# Patient Record
Sex: FEMALE | Race: WHITE | NOT HISPANIC OR LATINO | Employment: FULL TIME | URBAN - METROPOLITAN AREA
[De-identification: names, ages, dates, MRNs, and addresses within clinical notes are randomized per-mention and may not be internally consistent; named-entity substitution may affect disease eponyms.]

---

## 2017-03-01 RX ORDER — HYDROCHLOROTHIAZIDE 25 MG/1
TABLET ORAL
Qty: 90 TAB | Refills: 0 | Status: SHIPPED | OUTPATIENT
Start: 2017-03-01 | End: 2017-04-11 | Stop reason: SDUPTHER

## 2017-03-01 NOTE — Clinical Note
March 1, 2017        Mary Young  [address]  [facility] [address]        Dear Mary:    .This letter is to inform you the you are due for an annual appointment. Please contact our scheduling department at [phone] To schedule       If you have any questions or concerns, please don't hesitate to call.        Sincerely,        Juan A Tsang M.D.    Electronically Signed

## 2017-03-01 NOTE — TELEPHONE ENCOUNTER
Refill done. Patient is due for annual appointment. Please have patient schedule.  Juan A Tsang M.D.

## 2017-03-06 RX ORDER — LOSARTAN POTASSIUM 100 MG/1
TABLET ORAL
Refills: 0 | OUTPATIENT
Start: 2017-03-06

## 2017-03-06 NOTE — TELEPHONE ENCOUNTER
Refill declined.  Patient is due for annual appointment. We have asked patient to schedule appointment for the last 6 months and she has not done so.  Unfortunately cannot continue prescribing one pressure medication without checking blood pressure and lab work to monitoring for potential side effects.  Juan A Tsang M.D.

## 2017-04-11 ENCOUNTER — OFFICE VISIT (OUTPATIENT)
Dept: MEDICAL GROUP | Facility: MEDICAL CENTER | Age: 57
End: 2017-04-11
Payer: COMMERCIAL

## 2017-04-11 VITALS
TEMPERATURE: 99.1 F | HEART RATE: 74 BPM | BODY MASS INDEX: 39.9 KG/M2 | SYSTOLIC BLOOD PRESSURE: 132 MMHG | DIASTOLIC BLOOD PRESSURE: 84 MMHG | WEIGHT: 216.8 LBS | OXYGEN SATURATION: 97 % | RESPIRATION RATE: 16 BRPM | HEIGHT: 62 IN

## 2017-04-11 DIAGNOSIS — Z00.00 PREVENTATIVE HEALTH CARE: ICD-10-CM

## 2017-04-11 DIAGNOSIS — Z76.89 ENCOUNTER TO ESTABLISH CARE: ICD-10-CM

## 2017-04-11 DIAGNOSIS — E78.00 PURE HYPERCHOLESTEROLEMIA: ICD-10-CM

## 2017-04-11 DIAGNOSIS — E03.8 SUBCLINICAL HYPOTHYROIDISM: ICD-10-CM

## 2017-04-11 DIAGNOSIS — I10 ESSENTIAL HYPERTENSION: ICD-10-CM

## 2017-04-11 PROCEDURE — 99214 OFFICE O/P EST MOD 30 MIN: CPT | Performed by: PHYSICIAN ASSISTANT

## 2017-04-11 RX ORDER — LOSARTAN POTASSIUM 100 MG/1
100 TABLET ORAL
Qty: 90 TAB | Refills: 1 | Status: SHIPPED | OUTPATIENT
Start: 2017-04-11 | End: 2017-10-05 | Stop reason: SDUPTHER

## 2017-04-11 RX ORDER — HYDROCHLOROTHIAZIDE 25 MG/1
25 TABLET ORAL
Qty: 90 TAB | Refills: 1 | Status: SHIPPED | OUTPATIENT
Start: 2017-04-11 | End: 2017-10-10 | Stop reason: SDUPTHER

## 2017-04-11 ASSESSMENT — PATIENT HEALTH QUESTIONNAIRE - PHQ9: CLINICAL INTERPRETATION OF PHQ2 SCORE: 0

## 2017-04-11 NOTE — ASSESSMENT & PLAN NOTE
Previous labs were done in the end of 2015. LDL was 222. HDL was greater than in the upper 80s. Total cholesterol was greater than 300. Both her parents are on lipid lowering medications.

## 2017-04-11 NOTE — ASSESSMENT & PLAN NOTE
This is a 56-year-old female who is here today to get refills of her blood pressure medication. She is not out of medication yet. She takes 2 medications. Looks like her blood pressure is still out of control even though she is on medication. She does take her blood pressure medication on a regular basis. She has a wrist cuff.

## 2017-04-11 NOTE — ASSESSMENT & PLAN NOTE
Last labs showed that her TSH was greater than 5. This was not ever discussed with her. She denies any significant hypothyroid symptoms such as fatigue or weight gain or hair loss.

## 2017-04-11 NOTE — PROGRESS NOTES
"Subjective:   Mary Young is a 56 y.o. female here today to establish care and discuss her hypertension.    Essential hypertension  This is a 56-year-old female who is here today to get refills of her blood pressure medication. She is not out of medication yet. She takes 2 medications. Looks like her blood pressure is still out of control even though she is on medication. She does take her blood pressure medication on a regular basis. She has a wrist cuff.    Hyperlipidemia  Previous labs were done in the end of 2015. LDL was 222. HDL was greater than in the upper 80s. Total cholesterol was greater than 300. Both her parents are on lipid lowering medications.    Subclinical hypothyroidism  Last labs showed that her TSH was greater than 5. This was not ever discussed with her. She denies any significant hypothyroid symptoms such as fatigue or weight gain or hair loss.    Preventative health care  Requesting a referral to see gynecology for a Pap smear. Also requesting mammogram ordered.       Current medicines (including changes today)  Current Outpatient Prescriptions   Medication Sig Dispense Refill   • hydrochlorothiazide (HYDRODIURIL) 25 MG Tab Take 1 Tab by mouth every day. 90 Tab 1   • losartan (COZAAR) 100 MG Tab Take 1 Tab by mouth every day. 90 Tab 1     No current facility-administered medications for this visit.     She  has no past medical history on file.    ROS   No chest pain, no shortness of breath, no abdominal pain and all other systems were reviewed and are negative.       Objective:     Blood pressure 132/84, pulse 74, temperature 37.3 °C (99.1 °F), resp. rate 16, height 1.575 m (5' 2\"), weight 98.34 kg (216 lb 12.8 oz), SpO2 97 %. Body mass index is 39.64 kg/(m^2).   Physical Exam:  Constitutional: Alert, no distress.  Skin: Warm, dry, good turgor, no rashes in visible areas.  Eye: Equal, round and reactive, conjunctiva clear, lids normal.  ENMT: Lips without lesions, good dentition, " oropharynx clear.  Neck: Trachea midline, no masses.   Lymph: No cervical or supraclavicular lymphadenopathy  Respiratory: Unlabored respiratory effort, lungs clear to auscultation, no wheezes, no ronchi.  Cardiovascular: Normal S1, S2, no murmur, no edema.  Abdomen: Soft, non-tender, no masses.  Psych: Alert and oriented x3, normal affect and mood.        Assessment and Plan:   The following treatment plan was discussed    1. Essential hypertension  Chronic condition. Levels slightly elevated today. Provided prescription for hydrochlorothiazide and losartan as directed. Advised her to check her blood pressure at home. Contact me if her blood pressure is not controlled at the range of 120/70.  - hydrochlorothiazide (HYDRODIURIL) 25 MG Tab; Take 1 Tab by mouth every day.  Dispense: 90 Tab; Refill: 1  - losartan (COZAAR) 100 MG Tab; Take 1 Tab by mouth every day.  Dispense: 90 Tab; Refill: 1    2. Subclinical hypothyroidism  Discussed with subclinical presentation of her thyroid. Repeat TSH with free T4.  - TSH+FREE T4    3. Pure hypercholesterolemia  Chronic condition. Obviously her HDL is elevated but so is her LDL. We'll repeat lipid profile. I did urge her that we will likely have to place her on lipid-lowering medication.  - LIPID PROFILE    4. Preventative health care  Ordered fasting labs. Referred to gynecology for Pap smear. Referred for a mammogram. She may contact them for an appointment.  - COMP METABOLIC PANEL; Future  - HEMOGLOBIN A1C; Future  - MA-SCREEN MAMMO W/CAD-BILAT  - REFERRAL TO GYNECOLOGY    5. Encounter to establish care      Followup: Return if symptoms worsen or fail to improve.    Please note that this dictation was created using voice recognition software. I have made every reasonable attempt to correct obvious errors, but I expect that there are errors of grammar and possibly content that I did not discover before finalizing the note.

## 2017-10-05 DIAGNOSIS — I10 ESSENTIAL HYPERTENSION: ICD-10-CM

## 2017-10-05 RX ORDER — LOSARTAN POTASSIUM 100 MG/1
TABLET ORAL
Qty: 90 TAB | Refills: 0 | Status: SHIPPED | OUTPATIENT
Start: 2017-10-05 | End: 2018-04-03 | Stop reason: SDUPTHER

## 2017-10-05 NOTE — TELEPHONE ENCOUNTER
Was the patient seen in the last year in this department? Yes     Does patient have an active prescription for medications requested? No     Received Request Via: Pharmacy     Last Visit: 4/11/17    Last Labs: 11/19/15

## 2017-10-10 DIAGNOSIS — I10 ESSENTIAL HYPERTENSION: ICD-10-CM

## 2017-10-10 RX ORDER — HYDROCHLOROTHIAZIDE 25 MG/1
25 TABLET ORAL
Qty: 90 TAB | Refills: 1 | Status: SHIPPED | OUTPATIENT
Start: 2017-10-10 | End: 2018-04-01 | Stop reason: SDUPTHER

## 2017-10-10 NOTE — TELEPHONE ENCOUNTER
Was the patient seen in the last year in this department? Yes     Does patient have an active prescription for medications requested? No     Received Request Via: Patient     Last Visit: 4/11/17    Last Labs: 11/19/15

## 2018-01-23 ENCOUNTER — OFFICE VISIT (OUTPATIENT)
Dept: MEDICAL GROUP | Facility: MEDICAL CENTER | Age: 58
End: 2018-01-23
Payer: COMMERCIAL

## 2018-01-23 VITALS
DIASTOLIC BLOOD PRESSURE: 74 MMHG | HEART RATE: 100 BPM | RESPIRATION RATE: 16 BRPM | TEMPERATURE: 98.2 F | OXYGEN SATURATION: 96 % | BODY MASS INDEX: 41.41 KG/M2 | HEIGHT: 62 IN | SYSTOLIC BLOOD PRESSURE: 130 MMHG | WEIGHT: 225 LBS

## 2018-01-23 DIAGNOSIS — E66.01 MORBID OBESITY WITH BMI OF 40.0-44.9, ADULT (HCC): ICD-10-CM

## 2018-01-23 DIAGNOSIS — Z12.31 SCREENING MAMMOGRAM, ENCOUNTER FOR: ICD-10-CM

## 2018-01-23 DIAGNOSIS — N76.0 ACUTE VAGINITIS: ICD-10-CM

## 2018-01-23 PROCEDURE — 99214 OFFICE O/P EST MOD 30 MIN: CPT | Performed by: FAMILY MEDICINE

## 2018-01-23 RX ORDER — CLOTRIMAZOLE AND BETAMETHASONE DIPROPIONATE 10; .64 MG/G; MG/G
CREAM TOPICAL
Qty: 30 G | Refills: 1 | Status: SHIPPED | OUTPATIENT
Start: 2018-01-23 | End: 2019-04-09

## 2018-01-23 RX ORDER — FLUCONAZOLE 150 MG/1
150 TABLET ORAL
Qty: 2 TAB | Refills: 0 | Status: SHIPPED | OUTPATIENT
Start: 2018-01-23 | End: 2019-04-09

## 2018-01-23 NOTE — ASSESSMENT & PLAN NOTE
This patient just completed a round of Augmentin that she had to take because of an eye infection. Her last dose was 48 hours ago. She believes on that day or possibly the day before she tried to have intercourse with her  and it was so painful she could not complete the act. She's never had that problem before. She went online and did some research and it stated that one of the side effects of Augmentin often his yeast infection. She went to the store bought something for yeast then became uncomfortable with treating herself and is here today for treatment.

## 2018-01-23 NOTE — PROGRESS NOTES
CC: Possible vaginitis    HISTORY OF PRESENT ILLNESS: Patient is a 57 y.o. female established patient who presents today to complain that after completing a round of Augmentin for an eye infection she's now having discomfort in her genital area with associated erythema and minimal discharge. She decided that this was a yeast infection and went to get this over-the-counter medication to treat it but then decided that she needed to be seen.    Health Maintenance: Patient is overdue for a Pap but because she has a yeast infection today I cannot do her Pap smear. She will go for mammogram and that was ordered today.    Acute vaginitis  This patient just completed a round of Augmentin that she had to take because of an eye infection. Her last dose was 48 hours ago. She believes on that day or possibly the day before she tried to have intercourse with her  and it was so painful she could not complete the act. She's never had that problem before. She went online and did some research and it stated that one of the side effects of Augmentin often his yeast infection. She went to the store bought something for yeast then became uncomfortable with treating herself and is here today for treatment.      Patient Active Problem List    Diagnosis Date Noted   • Morbid obesity with BMI of 40.0-44.9, adult ([facility]) 01/23/2018   • Acute vaginitis 01/23/2018   • Subclinical hypothyroidism 04/11/2017   • Preventative health care 04/11/2017   • Hyperlipidemia 12/13/2015   • Essential hypertension 05/28/2015   • Insomnia 05/28/2015   • Obesity (BMI 30-39.9) 05/28/2015      Allergies:Patient has no known allergies.    Current Outpatient Prescriptions   Medication Sig Dispense Refill   • clotrimazole-betamethasone (LOTRISONE) 1-0.05 % Cream Apply to affected area 2 times/day til clear 30 g 1   • fluconazole (DIFLUCAN) 150 MG tablet Take 1 Tab by mouth every 7 days. 2 Tab 0   • hydrochlorothiazide (HYDRODIURIL) 25 MG Tab Take 1 Tab by  "mouth every day. 90 Tab 1   • losartan (COZAAR) 100 MG Tab TAKE 1 TABLET BY MOUTH EVERY DAY. 90 Tab 0     No current facility-administered medications for this visit.        Social History   Substance Use Topics   • Smoking status: Never Smoker   • Smokeless tobacco: Never Used   • Alcohol use 0.0 oz/week      Comment: 3 drinks daily     Social History     Social History Narrative   • No narrative on file       Family History   Problem Relation Age of Onset   • Hypertension Mother    • Hypertension Father         ROS:     - Constitutional: Negative for fever, chills, unexpected weight change, and fatigue/generalized weakness.     - HEENT: Negative for headaches, vision changes, hearing changes, ear pain, ear discharge, rhinorrhea, sinus congestion, sore throat, and neck pain.      - Respiratory: Negative for cough, sputum production, chest congestion, dyspnea, wheezing, and crackles.      - Cardiovascular: Negative for chest pain, palpitations, orthopnea, and bilateral lower extremity edema.     - Gastrointestinal: Negative for heartburn, nausea, vomiting, abdominal pain, hematochezia, melena, diarrhea, constipation, and greasy/foul-smelling stools.     - Genitourinary: . Positive for cheesy white discharge and erythema and itching in the genital area    - Musculoskeletal: Negative for myalgias, back pain, and joint pain.     - Skin: Negative for rash, itching, cyanotic skin color change.     - Neurological: Negative for dizziness, tingling, tremors, focal sensory deficit, focal weakness and headaches.     - Endo/Heme/Allergies: Does not bruise/bleed easily.     - Psychiatric/Behavioral: Negative for depression, suicidal/homicidal ideation and memory loss.        - NOTE: All other systems reviewed and are negative, except as in HPI.      Exam:    Blood pressure 130/74, pulse 100, temperature 36.8 °C (98.2 °F), resp. rate 16, height 1.575 m (5' 2\"), weight 102.1 kg (225 lb), SpO2 96 %, not currently breastfeeding. " Body mass index is 41.15 kg/m².    General:  Well nourished, well developed female in NAD  Head is grossly normal.  Pelvic exam:  Perineum: No external lesions are noted, color is erythematous throughout and extending down onto the perineum and circling the anus.  Vagina: Vaginal vault is well rugated. Has a thick white cheesy discharge Cervix: Parous, nonfriable        Please note that this dictation was created using voice recognition software. I have made every reasonable attempt to correct obvious errors, but I expect that there are errors of grammar and possibly content that I did not discover before finalizing the note.    Assessment/Plan:  1. Morbid obesity with BMI of 40.0-44.9, adult ([facility])  Patient working on weight loss  - Patient identified as having weight management issue.  Appropriate orders and counseling given.    2. Screening mammogram, encounter for  Patient will get her mammogram done  - MA-MAMMO SCREENING BILAT W/JESSE W/CAD; Future    3. Acute vaginitis  It appears this patient has an acute yeast vulvovaginitis. We will treat with Diflucan 150 mg now and again in 7 days. She'll use Lotrimin cream to the external tissue to help with itching twice a day until the erythema is gone. I have asked that the patient wait at least 3 days have intercourse with her .

## 2018-02-05 ENCOUNTER — HOSPITAL ENCOUNTER (OUTPATIENT)
Dept: RADIOLOGY | Facility: MEDICAL CENTER | Age: 58
End: 2018-02-05
Attending: FAMILY MEDICINE
Payer: COMMERCIAL

## 2018-02-05 DIAGNOSIS — Z12.31 SCREENING MAMMOGRAM, ENCOUNTER FOR: ICD-10-CM

## 2018-02-05 PROCEDURE — 77067 SCR MAMMO BI INCL CAD: CPT

## 2018-03-29 ENCOUNTER — HOSPITAL ENCOUNTER (OUTPATIENT)
Facility: MEDICAL CENTER | Age: 58
End: 2018-03-29
Attending: OBSTETRICS & GYNECOLOGY
Payer: COMMERCIAL

## 2018-03-29 ENCOUNTER — GYNECOLOGY VISIT (OUTPATIENT)
Dept: OBGYN | Facility: MEDICAL CENTER | Age: 58
End: 2018-03-29
Payer: COMMERCIAL

## 2018-03-29 VITALS
HEIGHT: 62 IN | DIASTOLIC BLOOD PRESSURE: 82 MMHG | SYSTOLIC BLOOD PRESSURE: 125 MMHG | WEIGHT: 207 LBS | BODY MASS INDEX: 38.09 KG/M2

## 2018-03-29 DIAGNOSIS — Z11.51 SCREENING FOR HUMAN PAPILLOMAVIRUS (HPV): ICD-10-CM

## 2018-03-29 DIAGNOSIS — Z01.419 WELL WOMAN EXAM WITH ROUTINE GYNECOLOGICAL EXAM: ICD-10-CM

## 2018-03-29 DIAGNOSIS — N95.2 VAGINAL ATROPHY: ICD-10-CM

## 2018-03-29 DIAGNOSIS — Z12.4 PAP SMEAR FOR CERVICAL CANCER SCREENING: ICD-10-CM

## 2018-03-29 PROCEDURE — 88175 CYTOPATH C/V AUTO FLUID REDO: CPT

## 2018-03-29 PROCEDURE — 99386 PREV VISIT NEW AGE 40-64: CPT | Performed by: OBSTETRICS & GYNECOLOGY

## 2018-03-29 PROCEDURE — 87624 HPV HI-RISK TYP POOLED RSLT: CPT

## 2018-03-29 NOTE — PROGRESS NOTES
"Subjective:      Mary Young is a 57 y.o. female who presents with New Patient (Annual exam)        CC: annual exam    HPI: 56 yo  menopausal female presents for annual exam.  She has had not bleeding for 5 years.  She recently had a vaginal yeast infection after taking Augmentin in January.  She was treated with Diflucan, but feels like her vagina has been irritated ever since.  She now has pain with intercourse, especially on the right side of her vagina.    ROS REVIEW OF SYSTEMS:    Pertinent positives and negatives mentioned in HPI.    All other systems reviewed and are negative or noncontributory.       Objective:     /82   Ht 1.575 m (5' 2\")   Wt 93.9 kg (207 lb)   Breastfeeding? No   BMI 37.86 kg/m²      Physical Exam      GENERAL: Alert, in no apparent distress  PSYCHIATRIC: Appropriate affect, intact insight and judgement.  NECK:  Nontender, no masses.  No Thyromegaly or nodules. No lymphadenopathy.  RESPIRATORY: Normal respiratory effort.  Lungs clear to auscultation.   CARDIOVASCULAR: RRR, no murmur, gallop, or rub.  ABDOMEN: Soft, obese, nontender, nondistended.  No palpable masses.  No rebound or guarding.  No inguinal lymphadenopathy.  No hepatosplenomegaly.  No hernias.  BACK: No CVA tenderness  EXTREMITIES: No edema  SKIN: No rash    BREAST: No masses or tenderness.  No skin changes.  No nipple inversion or discharge. No axillary lymphadenopathy.      GENITOURINARY:  Normal external genitalia, no lesions.  Normal urethral meatus, no masses or tenderness.  Normal bladder without fullness or masses.  Vagina atrophic, minimal white vaginal discharge. No lesions.   Cervix without lesions or discharge, nontender.  Uterus normal size, shape, and contour, nontender.  Adnexa nontender, no masses.  Exam limited due to habitus.   Normal anus and perineum.    Rectal Exam - not indicated.       Assessment/Plan:     1. Well woman exam with routine gynecological exam  Reviewed monthly self " breast exams and need for yearly mammograms starting at age 40.  Discussed calcium intake, Vitamin D,  and weight bearing exercise for bone health.  Mammogram current.   - ThinPrep Pap, w/HPV rflx to genotype; Future    2. Pap smear for cervical cancer screening  - ThinPrep Pap, w/HPV rflx to genotype; Future    3. Screening for human papillomavirus (HPV)  - ThinPrep Pap, w/HPV rflx to genotype; Future    4. Vaginal atrophy  Premarin vaginal cream 0.5 mg per vagina nightly for two weeks, then every other night. Reviewed minimal risks, as limited systemic absorption.      F/U 1 month.

## 2018-03-31 LAB
CYTOLOGY REG CYTOL: NORMAL
HPV HR 12 DNA CVX QL NAA+PROBE: NEGATIVE
HPV16 DNA SPEC QL NAA+PROBE: NEGATIVE
HPV18 DNA SPEC QL NAA+PROBE: NEGATIVE
SPECIMEN SOURCE: NORMAL

## 2018-04-01 DIAGNOSIS — I10 ESSENTIAL HYPERTENSION: ICD-10-CM

## 2018-04-02 RX ORDER — HYDROCHLOROTHIAZIDE 25 MG/1
TABLET ORAL
Qty: 90 TAB | Refills: 0 | Status: SHIPPED | OUTPATIENT
Start: 2018-04-02 | End: 2018-06-30 | Stop reason: SDUPTHER

## 2018-04-03 DIAGNOSIS — I10 ESSENTIAL HYPERTENSION: ICD-10-CM

## 2018-04-03 RX ORDER — LOSARTAN POTASSIUM 100 MG/1
TABLET ORAL
Qty: 90 TAB | Refills: 0 | Status: SHIPPED | OUTPATIENT
Start: 2018-04-03 | End: 2018-07-06 | Stop reason: SDUPTHER

## 2018-05-03 ENCOUNTER — GYNECOLOGY VISIT (OUTPATIENT)
Dept: OBGYN | Facility: MEDICAL CENTER | Age: 58
End: 2018-05-03
Payer: COMMERCIAL

## 2018-05-03 VITALS
WEIGHT: 209 LBS | BODY MASS INDEX: 38.46 KG/M2 | HEIGHT: 62 IN | DIASTOLIC BLOOD PRESSURE: 80 MMHG | SYSTOLIC BLOOD PRESSURE: 120 MMHG

## 2018-05-03 DIAGNOSIS — N95.2 VAGINAL ATROPHY: ICD-10-CM

## 2018-05-03 DIAGNOSIS — N94.10 DYSPAREUNIA IN FEMALE: ICD-10-CM

## 2018-05-03 PROCEDURE — 99213 OFFICE O/P EST LOW 20 MIN: CPT | Performed by: OBSTETRICS & GYNECOLOGY

## 2018-05-03 NOTE — PROGRESS NOTES
"S: Mary presents for f/u of vaginal dryness/pain, and pain with intercourse.  She has been using the premarin cream as prescribed, and states her symptoms are much improved.  She still has some pain on the right, deep in the vagina.  No vaginal bleeding.  No vaginal discharge.  No pelvic pain.     O:Blood pressure 120/80, height 1.575 m (5' 2\"), weight 94.8 kg (209 lb), not currently breastfeeding.    GENERAL: Alert, in no apparent distress  PSYCHIATRIC: Appropriate affect, intact insight and judgement.    GENITOURINARY:  Normal external genitalia, no lesions.  Labia with mild erythema.  Normal urethral meatus, no masses or tenderness.  Normal bladder without fullness or masses.  Vagina well estrogenized, no vaginal discharge or lesions.  Cervix without lesions or discharge, nontender.  Uterus normal size, shape, and contour, nontender.  Adnexa nontender, no masses.  Normal anus and perineum.    Rectal Exam - not indicated.    A/P:  1. Vaginal atrophy - improved. Continue premarin 0.5 mg per vagina twice weekly.   Explained risks are minimal due to minimal systemic absorption. Call if evidence of vaginal bleeding.   2. Dyspareunia - improved.     F/U prn.  "

## 2018-06-13 ENCOUNTER — TELEPHONE (OUTPATIENT)
Dept: OBGYN | Facility: CLINIC | Age: 58
End: 2018-06-13

## 2018-06-30 DIAGNOSIS — I10 ESSENTIAL HYPERTENSION: ICD-10-CM

## 2018-07-02 RX ORDER — HYDROCHLOROTHIAZIDE 25 MG/1
TABLET ORAL
Qty: 90 TAB | Refills: 0 | Status: SHIPPED | OUTPATIENT
Start: 2018-07-02 | End: 2018-10-03 | Stop reason: SDUPTHER

## 2018-07-06 DIAGNOSIS — I10 ESSENTIAL HYPERTENSION: ICD-10-CM

## 2018-07-06 RX ORDER — LOSARTAN POTASSIUM 100 MG/1
TABLET ORAL
Qty: 90 TAB | Refills: 0 | Status: SHIPPED | OUTPATIENT
Start: 2018-07-06 | End: 2018-10-01 | Stop reason: SDUPTHER

## 2019-03-29 DIAGNOSIS — I10 ESSENTIAL HYPERTENSION: ICD-10-CM

## 2019-03-29 RX ORDER — HYDROCHLOROTHIAZIDE 25 MG/1
25 TABLET ORAL
Qty: 30 TAB | Refills: 0 | Status: SHIPPED | OUTPATIENT
Start: 2019-03-29 | End: 2019-04-09 | Stop reason: SDUPTHER

## 2019-04-08 ENCOUNTER — TELEPHONE (OUTPATIENT)
Dept: MEDICAL GROUP | Facility: MEDICAL CENTER | Age: 59
End: 2019-04-08

## 2019-04-08 NOTE — TELEPHONE ENCOUNTER
1. Caller Name: Mary Young                                           Call Back Number: [phone] (home) [phone] (work)        Patient approves a detailed voicemail message: yes    Pt is requesting alternative to losartan as it is currently on recall for causing cancer.

## 2019-04-09 ENCOUNTER — OFFICE VISIT (OUTPATIENT)
Dept: MEDICAL GROUP | Facility: MEDICAL CENTER | Age: 59
End: 2019-04-09
Payer: COMMERCIAL

## 2019-04-09 VITALS
RESPIRATION RATE: 12 BRPM | DIASTOLIC BLOOD PRESSURE: 90 MMHG | WEIGHT: 207.45 LBS | BODY MASS INDEX: 38.18 KG/M2 | TEMPERATURE: 99.3 F | HEART RATE: 101 BPM | HEIGHT: 62 IN | SYSTOLIC BLOOD PRESSURE: 130 MMHG | OXYGEN SATURATION: 97 %

## 2019-04-09 DIAGNOSIS — E03.8 SUBCLINICAL HYPOTHYROIDISM: ICD-10-CM

## 2019-04-09 DIAGNOSIS — Z00.00 PREVENTATIVE HEALTH CARE: ICD-10-CM

## 2019-04-09 DIAGNOSIS — I10 ESSENTIAL HYPERTENSION: ICD-10-CM

## 2019-04-09 DIAGNOSIS — E66.9 OBESITY (BMI 30-39.9): ICD-10-CM

## 2019-04-09 DIAGNOSIS — E78.00 PURE HYPERCHOLESTEROLEMIA: ICD-10-CM

## 2019-04-09 DIAGNOSIS — Z12.31 VISIT FOR SCREENING MAMMOGRAM: ICD-10-CM

## 2019-04-09 PROBLEM — E66.01 MORBID OBESITY WITH BMI OF 40.0-44.9, ADULT (HCC): Status: RESOLVED | Noted: 2018-01-23 | Resolved: 2019-04-09

## 2019-04-09 PROBLEM — N76.0 ACUTE VAGINITIS: Status: RESOLVED | Noted: 2018-01-23 | Resolved: 2019-04-09

## 2019-04-09 PROCEDURE — 99214 OFFICE O/P EST MOD 30 MIN: CPT | Performed by: PHYSICIAN ASSISTANT

## 2019-04-09 RX ORDER — HYDROCHLOROTHIAZIDE 25 MG/1
25 TABLET ORAL
Qty: 90 TAB | Refills: 3 | Status: SHIPPED | OUTPATIENT
Start: 2019-04-09 | End: 2020-04-19

## 2019-04-09 RX ORDER — LOSARTAN POTASSIUM 100 MG/1
100 TABLET ORAL
Qty: 90 TAB | Refills: 3 | Status: SHIPPED | OUTPATIENT
Start: 2019-04-09 | End: 2020-04-19

## 2019-04-09 NOTE — ASSESSMENT & PLAN NOTE
This is a 58-year-old female who is here today to discuss her history of hypertension.  She is concerned over the recall of her losartan.  Unable to get a direct response from the  company regarding the current dose is considered contaminated or not.  She is concerned about developing cancer.  She was given a new prescription of losartan.  The batch number as well as expiration date are not on the list of recall medications.  She is also taking hydrochlorothiazide 25 mg daily.  Takes both medications in combination.  She is going to be more persistent with weight loss.  Start an exercise routine.  Is eating healthier.  Like not to be on medication if at all possible.

## 2019-04-09 NOTE — ASSESSMENT & PLAN NOTE
Past her TSH value was slightly elevated in the 5.7 range.  She did not follow-up and repeat the labs.  Complains of hair loss.

## 2019-04-09 NOTE — ASSESSMENT & PLAN NOTE
Chronic condition.  Has lost weight since the last time I saw her.  She plans to continue to lose weight by changing lifestyle.

## 2019-04-09 NOTE — ASSESSMENT & PLAN NOTE
Chronic condition.  Patient not interested in starting medication.  No recent cholesterol profile.

## 2019-04-09 NOTE — PROGRESS NOTES
Subjective:   Mary Young is a 58 y.o. female here today for hypertension, hyperlipidemia, obesity and subclinical hypothyroidism.    Essential hypertension  This is a 58-year-old female who is here today to discuss her history of hypertension.  She is concerned over the recall of her losartan.  Unable to get a direct response from the  company regarding the current dose is considered contaminated or not.  She is concerned about developing cancer.  She was given a new prescription of losartan.  The batch number as well as expiration date are not on the list of recall medications.  She is also taking hydrochlorothiazide 25 mg daily.  Takes both medications in combination.  She is going to be more persistent with weight loss.  Start an exercise routine.  Is eating healthier.  Like not to be on medication if at all possible.    Hyperlipidemia  Chronic condition.  Patient not interested in starting medication.  No recent cholesterol profile.    Obesity (BMI 30-39.9)  Chronic condition.  Has lost weight since the last time I saw her.  She plans to continue to lose weight by changing lifestyle.    Subclinical hypothyroidism  Past her TSH value was slightly elevated in the 5.7 range.  She did not follow-up and repeat the labs.  Complains of hair loss.       Current medicines (including changes today)  Current Outpatient Prescriptions   Medication Sig Dispense Refill   • hydroCHLOROthiazide (HYDRODIURIL) 25 MG Tab Take 1 Tab by mouth every day. 90 Tab 3   • losartan (COZAAR) 100 MG Tab Take 1 Tab by mouth every day. 90 Tab 3     No current facility-administered medications for this visit.      She  has a past medical history of Acute vaginitis (1/23/2018).    Social History and Family History were reviewed and updated.    ROS   No chest pain, no shortness of breath, no abdominal pain and all other systems were reviewed and are negative.       Objective:     /90 (BP Location: Right arm, Patient  "Position: Sitting, BP Cuff Size: Adult)   Pulse (!) 101   Temp 37.4 °C (99.3 °F) (Temporal)   Resp 12   Ht 1.575 m (5' 2\")   Wt 94.1 kg (207 lb 7.3 oz)   SpO2 97%  Body mass index is 37.94 kg/m².   Physical Exam:  Constitutional: Alert, no distress.  Skin: Warm, dry, good turgor, no rashes in visible areas.  Eye: Equal, round and reactive, conjunctiva clear, lids normal.  ENMT: Lips without lesions, good dentition, oropharynx clear.  Neck: Trachea midline, no masses.   Lymph: No cervical or supraclavicular lymphadenopathy  Respiratory: Unlabored respiratory effort, lungs clear to auscultation, no wheezes, no ronchi.  Cardiovascular: Normal S1, S2, no murmur, no edema.  Abdomen: Soft, non-tender, no masses.  Psych: Alert and oriented x3, normal affect and mood.        Assessment and Plan:   The following treatment plan was discussed    1. Essential hypertension  Chronic condition.  Stable.  Renewed hydrochlorothiazide and Cozaar.  Discussed in length that her current batch of medication is not contaminated.  Minimal risk with the contamination with [employer].  Continue to watch for possible notifications from her pharmacy.  Advised to eat healthier and exercise routinely.  Lose weight.  - hydroCHLOROthiazide (HYDRODIURIL) 25 MG Tab; Take 1 Tab by mouth every day.  Dispense: 90 Tab; Refill: 3  - Comp Metabolic Panel; Future  - losartan (COZAAR) 100 MG Tab; Take 1 Tab by mouth every day.  Dispense: 90 Tab; Refill: 3    2. Subclinical hypothyroidism  Previously noted on a lab draw with a slightly elevated TSH.  Repeat testing.  - TSH WITH REFLEX TO FT4; Future    3. Obesity (BMI 30-39.9)  Chronic condition.  Slight improvement.  Continue lifestyle changes.  Will monitor.  - Patient identified as having weight management issue.  Appropriate orders and counseling given.    4. Visit for screening mammogram  Ordered mammogram.  Provided information.  - MA-SCREENING MAMMO BILAT W/TOMOSYNTHESIS W/CAD; Future    5. " Preventative health care  Ordered labs.  Provided information and asked to fast.  - Comp Metabolic Panel; Future  - Lipid Profile; Future  - HEMOGLOBIN A1C; Future    6. Pure hypercholesterolemia  Chronic condition.  Unknown status.  Ordered lipid profile.      Followup: Return in about 1 year (around 4/9/2020).    Please note that this dictation was created using voice recognition software. I have made every reasonable attempt to correct obvious errors, but I expect that there are errors of grammar and possibly content that I did not discover before finalizing the note.

## 2020-05-19 DIAGNOSIS — I10 ESSENTIAL HYPERTENSION: ICD-10-CM

## 2020-05-19 RX ORDER — LOSARTAN POTASSIUM 100 MG/1
100 TABLET ORAL
Qty: 30 TAB | Refills: 0 | Status: SHIPPED | OUTPATIENT
Start: 2020-05-19 | End: 2021-09-21

## 2020-05-19 RX ORDER — HYDROCHLOROTHIAZIDE 25 MG/1
25 TABLET ORAL
Qty: 30 TAB | Refills: 0 | Status: SHIPPED | OUTPATIENT
Start: 2020-05-19 | End: 2021-09-21

## 2020-05-19 NOTE — TELEPHONE ENCOUNTER
Received request via: Patient    Was the patient seen in the last year in this department? Yes    Does the patient have an active prescription (recently filled or refills available) for medication(s) requested? No     Requested Prescriptions     Pending Prescriptions Disp Refills   • hydroCHLOROthiazide (HYDRODIURIL) 25 MG Tab 30 Tab 1     Sig: Take 1 Tab by mouth every day.   • losartan (COZAAR) 100 MG Tab 30 Tab 1     Sig: Take 1 Tab by mouth every day.       PT REQUEST 90 DAY SUPPLY

## 2020-06-25 ENCOUNTER — HOSPITAL ENCOUNTER (OUTPATIENT)
Dept: LAB | Facility: MEDICAL CENTER | Age: 60
End: 2020-06-25
Attending: FAMILY MEDICINE
Payer: COMMERCIAL

## 2020-06-25 LAB
ALBUMIN SERPL BCP-MCNC: 4.4 G/DL (ref 3.2–4.9)
ALBUMIN/GLOB SERPL: 1.4 G/DL
ALP SERPL-CCNC: 53 U/L (ref 30–99)
ALT SERPL-CCNC: 41 U/L (ref 2–50)
ANION GAP SERPL CALC-SCNC: 11 MMOL/L (ref 7–16)
AST SERPL-CCNC: 34 U/L (ref 12–45)
BASOPHILS # BLD AUTO: 1.2 % (ref 0–1.8)
BASOPHILS # BLD: 0.07 K/UL (ref 0–0.12)
BILIRUB SERPL-MCNC: 0.7 MG/DL (ref 0.1–1.5)
BUN SERPL-MCNC: 13 MG/DL (ref 8–22)
CALCIUM SERPL-MCNC: 9.7 MG/DL (ref 8.4–10.2)
CHLORIDE SERPL-SCNC: 102 MMOL/L (ref 96–112)
CHOLEST SERPL-MCNC: 252 MG/DL (ref 100–199)
CO2 SERPL-SCNC: 24 MMOL/L (ref 20–33)
CREAT SERPL-MCNC: 0.73 MG/DL (ref 0.5–1.4)
EOSINOPHIL # BLD AUTO: 0.14 K/UL (ref 0–0.51)
EOSINOPHIL NFR BLD: 2.3 % (ref 0–6.9)
ERYTHROCYTE [DISTWIDTH] IN BLOOD BY AUTOMATED COUNT: 41.8 FL (ref 35.9–50)
FASTING STATUS PATIENT QL REPORTED: NORMAL
GLOBULIN SER CALC-MCNC: 3.2 G/DL (ref 1.9–3.5)
GLUCOSE SERPL-MCNC: 108 MG/DL (ref 65–99)
HCT VFR BLD AUTO: 43.5 % (ref 37–47)
HDLC SERPL-MCNC: 84 MG/DL
HGB BLD-MCNC: 15 G/DL (ref 12–16)
IMM GRANULOCYTES # BLD AUTO: 0.02 K/UL (ref 0–0.11)
IMM GRANULOCYTES NFR BLD AUTO: 0.3 % (ref 0–0.9)
LDLC SERPL CALC-MCNC: 147 MG/DL
LYMPHOCYTES # BLD AUTO: 1.59 K/UL (ref 1–4.8)
LYMPHOCYTES NFR BLD: 26.5 % (ref 22–41)
MCH RBC QN AUTO: 32.7 PG (ref 27–33)
MCHC RBC AUTO-ENTMCNC: 34.5 G/DL (ref 33.6–35)
MCV RBC AUTO: 94.8 FL (ref 81.4–97.8)
MONOCYTES # BLD AUTO: 0.48 K/UL (ref 0–0.85)
MONOCYTES NFR BLD AUTO: 8 % (ref 0–13.4)
NEUTROPHILS # BLD AUTO: 3.69 K/UL (ref 2–7.15)
NEUTROPHILS NFR BLD: 61.7 % (ref 44–72)
NRBC # BLD AUTO: 0 K/UL
NRBC BLD-RTO: 0 /100 WBC
PLATELET # BLD AUTO: 199 K/UL (ref 164–446)
PMV BLD AUTO: 9.2 FL (ref 9–12.9)
POTASSIUM SERPL-SCNC: 3.8 MMOL/L (ref 3.6–5.5)
PROT SERPL-MCNC: 7.6 G/DL (ref 6–8.2)
RBC # BLD AUTO: 4.59 M/UL (ref 4.2–5.4)
SODIUM SERPL-SCNC: 137 MMOL/L (ref 135–145)
TRIGL SERPL-MCNC: 104 MG/DL (ref 0–149)
TSH SERPL DL<=0.005 MIU/L-ACNC: 3.89 UIU/ML (ref 0.38–5.33)
WBC # BLD AUTO: 6 K/UL (ref 4.8–10.8)

## 2020-06-25 PROCEDURE — 84443 ASSAY THYROID STIM HORMONE: CPT

## 2020-06-25 PROCEDURE — 80053 COMPREHEN METABOLIC PANEL: CPT

## 2020-06-25 PROCEDURE — 85025 COMPLETE CBC W/AUTO DIFF WBC: CPT

## 2020-06-25 PROCEDURE — 36415 COLL VENOUS BLD VENIPUNCTURE: CPT

## 2020-06-25 PROCEDURE — 80061 LIPID PANEL: CPT

## 2021-02-08 ENCOUNTER — HOSPITAL ENCOUNTER (OUTPATIENT)
Dept: LAB | Facility: MEDICAL CENTER | Age: 61
End: 2021-02-08
Attending: FAMILY MEDICINE
Payer: COMMERCIAL

## 2021-02-08 LAB
ANION GAP SERPL CALC-SCNC: 14 MMOL/L (ref 7–16)
BUN SERPL-MCNC: 8 MG/DL (ref 8–22)
CALCIUM SERPL-MCNC: 9.8 MG/DL (ref 8.4–10.2)
CHLORIDE SERPL-SCNC: 99 MMOL/L (ref 96–112)
CO2 SERPL-SCNC: 22 MMOL/L (ref 20–33)
CREAT SERPL-MCNC: 0.75 MG/DL (ref 0.5–1.4)
EST. AVERAGE GLUCOSE BLD GHB EST-MCNC: 100 MG/DL
GLUCOSE SERPL-MCNC: 104 MG/DL (ref 65–99)
HBA1C MFR BLD: 5.1 % (ref 0–5.6)
POTASSIUM SERPL-SCNC: 3.8 MMOL/L (ref 3.6–5.5)
SODIUM SERPL-SCNC: 135 MMOL/L (ref 135–145)

## 2021-02-08 PROCEDURE — 36415 COLL VENOUS BLD VENIPUNCTURE: CPT

## 2021-02-08 PROCEDURE — 83036 HEMOGLOBIN GLYCOSYLATED A1C: CPT

## 2021-02-08 PROCEDURE — 80048 BASIC METABOLIC PNL TOTAL CA: CPT

## 2021-09-21 ENCOUNTER — OFFICE VISIT (OUTPATIENT)
Dept: URGENT CARE | Facility: CLINIC | Age: 61
End: 2021-09-21
Payer: COMMERCIAL

## 2021-09-21 ENCOUNTER — APPOINTMENT (OUTPATIENT)
Dept: RADIOLOGY | Facility: IMAGING CENTER | Age: 61
End: 2021-09-21
Attending: PHYSICIAN ASSISTANT
Payer: COMMERCIAL

## 2021-09-21 VITALS
OXYGEN SATURATION: 96 % | DIASTOLIC BLOOD PRESSURE: 72 MMHG | SYSTOLIC BLOOD PRESSURE: 144 MMHG | BODY MASS INDEX: 34.3 KG/M2 | HEIGHT: 62 IN | HEART RATE: 92 BPM | WEIGHT: 186.4 LBS | RESPIRATION RATE: 14 BRPM | TEMPERATURE: 97.9 F

## 2021-09-21 DIAGNOSIS — M79.671 RIGHT FOOT PAIN: ICD-10-CM

## 2021-09-21 PROCEDURE — 99214 OFFICE O/P EST MOD 30 MIN: CPT | Performed by: PHYSICIAN ASSISTANT

## 2021-09-21 PROCEDURE — 73630 X-RAY EXAM OF FOOT: CPT | Mod: TC,FY,RT | Performed by: PHYSICIAN ASSISTANT

## 2021-09-21 RX ORDER — TELMISARTAN AND AMLODIPINE 5; 80 MG/1; MG/1
TABLET ORAL DAILY
COMMUNITY
Start: 2021-09-17

## 2021-09-21 ASSESSMENT — ENCOUNTER SYMPTOMS
FEVER: 0
WHEEZING: 0
SHORTNESS OF BREATH: 0
CHILLS: 0

## 2021-09-21 ASSESSMENT — FIBROSIS 4 INDEX: FIB4 SCORE: 1.65

## 2021-09-21 ASSESSMENT — PAIN SCALES - GENERAL: PAINLEVEL: 3=SLIGHT PAIN

## 2021-09-21 NOTE — PROGRESS NOTES
"  Subjective:   Mary Young is a 61 y.o. female who presents today with   Chief Complaint   Patient presents with   • Foot Swelling     x few wks; R foot swelling and pain     Foot Swelling  This is a new problem. The current episode started 1 to 4 weeks ago. The problem occurs constantly. The problem has been waxing and waning. Pertinent negatives include no chest pain, chills or fever. She has tried nothing for the symptoms. The treatment provided no relief.     Swelling for the past several weeks. Feeling a little bit better today.  PMH:  has a past medical history of Acute vaginitis (1/23/2018).  MEDS:   Current Outpatient Medications:   •  Telmisartan-amLODIPine 80-5 MG Tab, Take  by mouth every day., Disp: , Rfl:   •  Cholecalciferol 1.25 MG (35941 UT) Tab, Take 1.25 mg by mouth every day., Disp: , Rfl:   ALLERGIES:   Allergies   Allergen Reactions   • Lisinopril      Cough     SURGHX: No past surgical history on file.  SOCHX:  reports that she has never smoked. She has never used smokeless tobacco. She reports previous alcohol use. She reports that she does not use drugs.  FH: Reviewed with patient, not pertinent to this visit.     Review of Systems   Constitutional: Negative for chills and fever.   Respiratory: Negative for shortness of breath and wheezing.    Cardiovascular: Negative for chest pain and leg swelling.      Objective:   /72 (BP Location: Left arm, Patient Position: Sitting, BP Cuff Size: Adult)   Pulse 92   Temp 36.6 °C (97.9 °F) (Temporal)   Resp 14   Ht 1.575 m (5' 2\") Comment: verbal  Wt 84.6 kg (186 lb 6.4 oz)   SpO2 96%   BMI 34.09 kg/m²   Physical Exam  Vitals and nursing note reviewed.   Constitutional:       General: She is not in acute distress.     Appearance: Normal appearance. She is well-developed. She is not ill-appearing or toxic-appearing.   HENT:      Head: Normocephalic and atraumatic.      Right Ear: Hearing normal.      Left Ear: Hearing normal.   Eyes: "      Conjunctiva/sclera: Conjunctivae normal.   Cardiovascular:      Rate and Rhythm: Normal rate and regular rhythm.      Heart sounds: Normal heart sounds.   Pulmonary:      Effort: Pulmonary effort is normal.      Breath sounds: Normal breath sounds. No stridor. No wheezing or rales.   Musculoskeletal:      Right lower leg: No edema.      Left lower leg: No edema.        Legs:       Comments: TTP to 2nd through 5th MTP joints. Swelling to the dorsum of the foot and does not extend past the ankle.  Neurovascular intact distally to the right foot.  Less than 2 capillary refill.   Skin:     General: Skin is warm and dry.   Neurological:      Mental Status: She is alert.      Coordination: Coordination normal.   Psychiatric:         Mood and Affect: Mood normal.       DX FOOT   FINDINGS:  No acute fracture is noted. There is no dislocation.  No bone erosion is noted.     IMPRESSION:     No acute fracture or dislocation is noted.  Assessment/Plan:   Assessment    1. Right foot pain  - DX-FOOT-COMPLETE 3+ RIGHT; Future    Other orders  - Telmisartan-amLODIPine 80-5 MG Tab; Take  by mouth every day.  - Cholecalciferol 1.25 MG (50370 UT) Tab; Take 1.25 mg by mouth every day.  Recommend use of over-the-counter anti-inflammatory and continued rest, ice and elevation as well as compression.  Follow-up with primary care if symptoms persist.  If symptoms persist or swelling spreads beyond the foot patient will follow up with her primary care for labs and potential further work up.  Conservative treatment at this time.  No signs of DVT.  Differential diagnosis, natural history, supportive care, and indications for immediate follow-up discussed.  Patient has a history of bone spurs.  Symptoms are likely caused from arthritis at this time.  Patient will begin using over-the-counter anti-inflammatory to see if this helps her symptoms as well.  No signs of infection at this time.  Patient given instructions and understanding of  medications and treatment.    If not improving in 3-5 days, F/U with PCP or return to UC if symptoms worsen.    Patient agreeable to plan.  Greater than 30 minutes were spent reviewing patient's chart, examining and obtaining history from patient, and discussing plan of care.       Please note that this dictation was created using voice recognition software. I have made every reasonable attempt to correct obvious errors, but I expect that there are errors of grammar and possibly content that I did not discover before finalizing the note.    Chace Yen PA-C

## 2022-01-20 ENCOUNTER — HOSPITAL ENCOUNTER (OUTPATIENT)
Dept: LAB | Facility: MEDICAL CENTER | Age: 62
End: 2022-01-20
Attending: FAMILY MEDICINE
Payer: COMMERCIAL

## 2022-01-20 LAB
ALBUMIN SERPL BCP-MCNC: 4.3 G/DL (ref 3.2–4.9)
ALBUMIN/GLOB SERPL: 1.3 G/DL
ALP SERPL-CCNC: 76 U/L (ref 30–99)
ALT SERPL-CCNC: 40 U/L (ref 2–50)
ANION GAP SERPL CALC-SCNC: 16 MMOL/L (ref 7–16)
AST SERPL-CCNC: 43 U/L (ref 12–45)
BASOPHILS # BLD AUTO: 1.1 % (ref 0–1.8)
BASOPHILS # BLD: 0.08 K/UL (ref 0–0.12)
BILIRUB SERPL-MCNC: 0.6 MG/DL (ref 0.1–1.5)
BUN SERPL-MCNC: 9 MG/DL (ref 8–22)
CALCIUM SERPL-MCNC: 9.8 MG/DL (ref 8.4–10.2)
CHLORIDE SERPL-SCNC: 98 MMOL/L (ref 96–112)
CHOLEST SERPL-MCNC: 276 MG/DL (ref 100–199)
CO2 SERPL-SCNC: 21 MMOL/L (ref 20–33)
CREAT SERPL-MCNC: 0.74 MG/DL (ref 0.5–1.4)
EOSINOPHIL # BLD AUTO: 0.12 K/UL (ref 0–0.51)
EOSINOPHIL NFR BLD: 1.6 % (ref 0–6.9)
ERYTHROCYTE [DISTWIDTH] IN BLOOD BY AUTOMATED COUNT: 40.1 FL (ref 35.9–50)
FASTING STATUS PATIENT QL REPORTED: NORMAL
GLOBULIN SER CALC-MCNC: 3.2 G/DL (ref 1.9–3.5)
GLUCOSE SERPL-MCNC: 99 MG/DL (ref 65–99)
HCT VFR BLD AUTO: 40.6 % (ref 37–47)
HDLC SERPL-MCNC: 85 MG/DL
HGB BLD-MCNC: 13.9 G/DL (ref 12–16)
IMM GRANULOCYTES # BLD AUTO: 0.02 K/UL (ref 0–0.11)
IMM GRANULOCYTES NFR BLD AUTO: 0.3 % (ref 0–0.9)
LDLC SERPL CALC-MCNC: 178 MG/DL
LYMPHOCYTES # BLD AUTO: 1.96 K/UL (ref 1–4.8)
LYMPHOCYTES NFR BLD: 26.7 % (ref 22–41)
MCH RBC QN AUTO: 32.6 PG (ref 27–33)
MCHC RBC AUTO-ENTMCNC: 34.2 G/DL (ref 33.6–35)
MCV RBC AUTO: 95.3 FL (ref 81.4–97.8)
MONOCYTES # BLD AUTO: 0.62 K/UL (ref 0–0.85)
MONOCYTES NFR BLD AUTO: 8.4 % (ref 0–13.4)
NEUTROPHILS # BLD AUTO: 4.55 K/UL (ref 2–7.15)
NEUTROPHILS NFR BLD: 61.9 % (ref 44–72)
NRBC # BLD AUTO: 0 K/UL
NRBC BLD-RTO: 0 /100 WBC
PLATELET # BLD AUTO: 228 K/UL (ref 164–446)
PMV BLD AUTO: 9.6 FL (ref 9–12.9)
POTASSIUM SERPL-SCNC: 4 MMOL/L (ref 3.6–5.5)
PROT SERPL-MCNC: 7.5 G/DL (ref 6–8.2)
RBC # BLD AUTO: 4.26 M/UL (ref 4.2–5.4)
SODIUM SERPL-SCNC: 135 MMOL/L (ref 135–145)
TRIGL SERPL-MCNC: 66 MG/DL (ref 0–149)
TSH SERPL DL<=0.005 MIU/L-ACNC: 3.95 UIU/ML (ref 0.38–5.33)
WBC # BLD AUTO: 7.4 K/UL (ref 4.8–10.8)

## 2022-01-20 PROCEDURE — 80061 LIPID PANEL: CPT

## 2022-01-20 PROCEDURE — 36415 COLL VENOUS BLD VENIPUNCTURE: CPT

## 2022-01-20 PROCEDURE — 80053 COMPREHEN METABOLIC PANEL: CPT

## 2022-01-20 PROCEDURE — 84443 ASSAY THYROID STIM HORMONE: CPT

## 2022-01-20 PROCEDURE — 85025 COMPLETE CBC W/AUTO DIFF WBC: CPT

## 2023-07-28 ENCOUNTER — HOSPITAL ENCOUNTER (OUTPATIENT)
Dept: LAB | Facility: MEDICAL CENTER | Age: 63
End: 2023-07-28
Attending: FAMILY MEDICINE
Payer: COMMERCIAL

## 2023-07-28 LAB
25(OH)D3 SERPL-MCNC: 49 NG/ML (ref 30–100)
ALBUMIN SERPL BCP-MCNC: 4.2 G/DL (ref 3.2–4.9)
ALBUMIN/GLOB SERPL: 1.2 G/DL
ALP SERPL-CCNC: 78 U/L (ref 30–99)
ALT SERPL-CCNC: 38 U/L (ref 2–50)
ANION GAP SERPL CALC-SCNC: 16 MMOL/L (ref 7–16)
AST SERPL-CCNC: 49 U/L (ref 12–45)
BASOPHILS # BLD AUTO: 1 % (ref 0–1.8)
BASOPHILS # BLD: 0.07 K/UL (ref 0–0.12)
BILIRUB SERPL-MCNC: 1 MG/DL (ref 0.1–1.5)
BUN SERPL-MCNC: 12 MG/DL (ref 8–22)
CALCIUM ALBUM COR SERPL-MCNC: 9.8 MG/DL (ref 8.5–10.5)
CALCIUM SERPL-MCNC: 10 MG/DL (ref 8.4–10.2)
CHLORIDE SERPL-SCNC: 98 MMOL/L (ref 96–112)
CHOLEST SERPL-MCNC: 294 MG/DL (ref 100–199)
CO2 SERPL-SCNC: 20 MMOL/L (ref 20–33)
CREAT SERPL-MCNC: 0.73 MG/DL (ref 0.5–1.4)
EOSINOPHIL # BLD AUTO: 0.18 K/UL (ref 0–0.51)
EOSINOPHIL NFR BLD: 2.7 % (ref 0–6.9)
ERYTHROCYTE [DISTWIDTH] IN BLOOD BY AUTOMATED COUNT: 40.5 FL (ref 35.9–50)
EST. AVERAGE GLUCOSE BLD GHB EST-MCNC: 105 MG/DL
FASTING STATUS PATIENT QL REPORTED: NORMAL
GFR SERPLBLD CREATININE-BSD FMLA CKD-EPI: 92 ML/MIN/1.73 M 2
GLOBULIN SER CALC-MCNC: 3.5 G/DL (ref 1.9–3.5)
GLUCOSE SERPL-MCNC: 115 MG/DL (ref 65–99)
HBA1C MFR BLD: 5.3 % (ref 4–5.6)
HCT VFR BLD AUTO: 42.4 % (ref 37–47)
HDLC SERPL-MCNC: 95 MG/DL
HGB BLD-MCNC: 14.8 G/DL (ref 12–16)
IMM GRANULOCYTES # BLD AUTO: 0.03 K/UL (ref 0–0.11)
IMM GRANULOCYTES NFR BLD AUTO: 0.4 % (ref 0–0.9)
LDLC SERPL CALC-MCNC: 182 MG/DL
LYMPHOCYTES # BLD AUTO: 1.26 K/UL (ref 1–4.8)
LYMPHOCYTES NFR BLD: 18.9 % (ref 22–41)
MCH RBC QN AUTO: 33.2 PG (ref 27–33)
MCHC RBC AUTO-ENTMCNC: 34.9 G/DL (ref 32.2–35.5)
MCV RBC AUTO: 95.1 FL (ref 81.4–97.8)
MONOCYTES # BLD AUTO: 0.44 K/UL (ref 0–0.85)
MONOCYTES NFR BLD AUTO: 6.6 % (ref 0–13.4)
NEUTROPHILS # BLD AUTO: 4.69 K/UL (ref 1.82–7.42)
NEUTROPHILS NFR BLD: 70.4 % (ref 44–72)
NRBC # BLD AUTO: 0 K/UL
NRBC BLD-RTO: 0 /100 WBC (ref 0–0.2)
PLATELET # BLD AUTO: 240 K/UL (ref 164–446)
PMV BLD AUTO: 9 FL (ref 9–12.9)
POTASSIUM SERPL-SCNC: 4.1 MMOL/L (ref 3.6–5.5)
PROT SERPL-MCNC: 7.7 G/DL (ref 6–8.2)
RBC # BLD AUTO: 4.46 M/UL (ref 4.2–5.4)
SODIUM SERPL-SCNC: 134 MMOL/L (ref 135–145)
T4 FREE SERPL-MCNC: 1.26 NG/DL (ref 0.93–1.7)
TRIGL SERPL-MCNC: 84 MG/DL (ref 0–149)
TSH SERPL DL<=0.005 MIU/L-ACNC: 5.45 UIU/ML (ref 0.38–5.33)
WBC # BLD AUTO: 6.7 K/UL (ref 4.8–10.8)

## 2023-07-28 PROCEDURE — 83036 HEMOGLOBIN GLYCOSYLATED A1C: CPT

## 2023-07-28 PROCEDURE — 36415 COLL VENOUS BLD VENIPUNCTURE: CPT

## 2023-07-28 PROCEDURE — 80061 LIPID PANEL: CPT

## 2023-07-28 PROCEDURE — 80053 COMPREHEN METABOLIC PANEL: CPT

## 2023-07-28 PROCEDURE — 85025 COMPLETE CBC W/AUTO DIFF WBC: CPT

## 2023-07-28 PROCEDURE — 82306 VITAMIN D 25 HYDROXY: CPT

## 2023-07-28 PROCEDURE — 84443 ASSAY THYROID STIM HORMONE: CPT

## 2023-07-28 PROCEDURE — 84439 ASSAY OF FREE THYROXINE: CPT

## 2024-11-30 ENCOUNTER — APPOINTMENT (OUTPATIENT)
Dept: RADIOLOGY | Facility: MEDICAL CENTER | Age: 64
DRG: 894 | End: 2024-11-30
Attending: EMERGENCY MEDICINE
Payer: COMMERCIAL

## 2024-11-30 ENCOUNTER — HOSPITAL ENCOUNTER (INPATIENT)
Facility: MEDICAL CENTER | Age: 64
End: 2024-11-30
Attending: EMERGENCY MEDICINE | Admitting: HOSPITALIST
Payer: COMMERCIAL

## 2024-11-30 VITALS
HEIGHT: 62 IN | SYSTOLIC BLOOD PRESSURE: 135 MMHG | WEIGHT: 182 LBS | TEMPERATURE: 98.9 F | OXYGEN SATURATION: 92 % | RESPIRATION RATE: 18 BRPM | HEART RATE: 89 BPM | BODY MASS INDEX: 33.49 KG/M2 | DIASTOLIC BLOOD PRESSURE: 79 MMHG

## 2024-11-30 DIAGNOSIS — F10.930 ALCOHOL WITHDRAWAL SEIZURE WITHOUT COMPLICATION (HCC): ICD-10-CM

## 2024-11-30 DIAGNOSIS — R56.9 ALCOHOL WITHDRAWAL SEIZURE WITHOUT COMPLICATION (HCC): ICD-10-CM

## 2024-11-30 DIAGNOSIS — R79.89 ELEVATED TROPONIN: ICD-10-CM

## 2024-11-30 DIAGNOSIS — S99.911A INJURY OF RIGHT ANKLE, INITIAL ENCOUNTER: ICD-10-CM

## 2024-11-30 PROBLEM — E87.1 HYPONATREMIA: Status: ACTIVE | Noted: 2024-11-30

## 2024-11-30 PROBLEM — M25.571 RIGHT ANKLE PAIN: Status: ACTIVE | Noted: 2024-11-30

## 2024-11-30 PROBLEM — R74.8 ELEVATED LIVER ENZYMES: Status: ACTIVE | Noted: 2024-11-30

## 2024-11-30 PROBLEM — F10.939 ALCOHOL WITHDRAWAL SEIZURE WITH COMPLICATION (HCC): Status: ACTIVE | Noted: 2024-11-30

## 2024-11-30 PROBLEM — R73.9 HYPERGLYCEMIA: Status: ACTIVE | Noted: 2024-11-30

## 2024-11-30 PROBLEM — D72.819 LEUKOPENIA: Status: ACTIVE | Noted: 2024-11-30

## 2024-11-30 LAB
ALBUMIN SERPL BCP-MCNC: 4.6 G/DL (ref 3.2–4.9)
ALBUMIN/GLOB SERPL: 1.3 G/DL
ALP SERPL-CCNC: 90 U/L (ref 30–99)
ALT SERPL-CCNC: 159 U/L (ref 2–50)
ANION GAP SERPL CALC-SCNC: 20 MMOL/L (ref 7–16)
AST SERPL-CCNC: 177 U/L (ref 12–45)
BASOPHILS # BLD AUTO: 0.3 % (ref 0–1.8)
BASOPHILS # BLD: 0.01 K/UL (ref 0–0.12)
BILIRUB SERPL-MCNC: 0.6 MG/DL (ref 0.1–1.5)
BUN SERPL-MCNC: 11 MG/DL (ref 8–22)
CALCIUM ALBUM COR SERPL-MCNC: 9.4 MG/DL (ref 8.5–10.5)
CALCIUM SERPL-MCNC: 9.9 MG/DL (ref 8.4–10.2)
CHLORIDE SERPL-SCNC: 88 MMOL/L (ref 96–112)
CO2 SERPL-SCNC: 21 MMOL/L (ref 20–33)
CREAT SERPL-MCNC: 0.82 MG/DL (ref 0.5–1.4)
EKG IMPRESSION: NORMAL
EOSINOPHIL # BLD AUTO: 0 K/UL (ref 0–0.51)
EOSINOPHIL NFR BLD: 0 % (ref 0–6.9)
ERYTHROCYTE [DISTWIDTH] IN BLOOD BY AUTOMATED COUNT: 41.1 FL (ref 35.9–50)
EST. AVERAGE GLUCOSE BLD GHB EST-MCNC: 108 MG/DL
ETHANOL BLD-MCNC: <10.1 MG/DL
FLUAV RNA SPEC QL NAA+PROBE: NEGATIVE
FLUBV RNA SPEC QL NAA+PROBE: NEGATIVE
GFR SERPLBLD CREATININE-BSD FMLA CKD-EPI: 79 ML/MIN/1.73 M 2
GLOBULIN SER CALC-MCNC: 3.6 G/DL (ref 1.9–3.5)
GLUCOSE SERPL-MCNC: 135 MG/DL (ref 65–99)
HBA1C MFR BLD: 5.4 % (ref 4–5.6)
HCT VFR BLD AUTO: 43.7 % (ref 37–47)
HGB BLD-MCNC: 15.3 G/DL (ref 12–16)
IMM GRANULOCYTES # BLD AUTO: 0.03 K/UL (ref 0–0.11)
IMM GRANULOCYTES NFR BLD AUTO: 0.9 % (ref 0–0.9)
LACTATE SERPL-SCNC: 0.9 MMOL/L (ref 0.5–2)
LACTATE SERPL-SCNC: 2.1 MMOL/L (ref 0.5–2)
LG PLATELETS BLD QL SMEAR: NORMAL
LYMPHOCYTES # BLD AUTO: 0.25 K/UL (ref 1–4.8)
LYMPHOCYTES NFR BLD: 7.1 % (ref 22–41)
MCH RBC QN AUTO: 33.2 PG (ref 27–33)
MCHC RBC AUTO-ENTMCNC: 35 G/DL (ref 32.2–35.5)
MCV RBC AUTO: 94.8 FL (ref 81.4–97.8)
MONOCYTES # BLD AUTO: 0.35 K/UL (ref 0–0.85)
MONOCYTES NFR BLD AUTO: 10 % (ref 0–13.4)
NEUTROPHILS # BLD AUTO: 2.87 K/UL (ref 1.82–7.42)
NEUTROPHILS NFR BLD: 81.7 % (ref 44–72)
NRBC # BLD AUTO: 0 K/UL
NRBC BLD-RTO: 0 /100 WBC (ref 0–0.2)
PLATELET # BLD AUTO: 73 K/UL (ref 164–446)
PLATELET BLD QL SMEAR: NORMAL
PLATELETS.RETICULATED NFR BLD AUTO: 6.8 % (ref 0.6–13.1)
PMV BLD AUTO: 10 FL (ref 9–12.9)
POTASSIUM SERPL-SCNC: 4.1 MMOL/L (ref 3.6–5.5)
PROLACTIN SERPL-MCNC: 32.4 NG/ML (ref 2.8–26)
PROT SERPL-MCNC: 8.2 G/DL (ref 6–8.2)
RBC # BLD AUTO: 4.61 M/UL (ref 4.2–5.4)
RBC BLD AUTO: NORMAL
RSV RNA SPEC QL NAA+PROBE: NEGATIVE
SARS-COV-2 RNA RESP QL NAA+PROBE: DETECTED
SODIUM SERPL-SCNC: 124 MMOL/L (ref 135–145)
SODIUM SERPL-SCNC: 129 MMOL/L (ref 135–145)
SPECIMEN SOURCE: ABNORMAL
T3FREE SERPL-MCNC: 2.62 PG/ML (ref 2–4.4)
TROPONIN T SERPL-MCNC: 25 NG/L (ref 6–19)
TROPONIN T SERPL-MCNC: 28 NG/L (ref 6–19)
TROPONIN T SERPL-MCNC: 33 NG/L (ref 6–19)
TROPONIN T SERPL-MCNC: 43 NG/L (ref 6–19)
TSH SERPL DL<=0.005 MIU/L-ACNC: 3.61 UIU/ML (ref 0.38–5.33)
WBC # BLD AUTO: 3.5 K/UL (ref 4.8–10.8)

## 2024-11-30 PROCEDURE — 36415 COLL VENOUS BLD VENIPUNCTURE: CPT

## 2024-11-30 PROCEDURE — 84295 ASSAY OF SERUM SODIUM: CPT

## 2024-11-30 PROCEDURE — 84146 ASSAY OF PROLACTIN: CPT

## 2024-11-30 PROCEDURE — A9270 NON-COVERED ITEM OR SERVICE: HCPCS | Performed by: EMERGENCY MEDICINE

## 2024-11-30 PROCEDURE — 83605 ASSAY OF LACTIC ACID: CPT

## 2024-11-30 PROCEDURE — 84481 FREE ASSAY (FT-3): CPT

## 2024-11-30 PROCEDURE — 84443 ASSAY THYROID STIM HORMONE: CPT

## 2024-11-30 PROCEDURE — 85025 COMPLETE CBC W/AUTO DIFF WBC: CPT

## 2024-11-30 PROCEDURE — 770020 HCHG ROOM/CARE - TELE (206)

## 2024-11-30 PROCEDURE — 82077 ASSAY SPEC XCP UR&BREATH IA: CPT

## 2024-11-30 PROCEDURE — 700111 HCHG RX REV CODE 636 W/ 250 OVERRIDE (IP): Performed by: HOSPITALIST

## 2024-11-30 PROCEDURE — 99223 1ST HOSP IP/OBS HIGH 75: CPT | Performed by: HOSPITALIST

## 2024-11-30 PROCEDURE — 99285 EMERGENCY DEPT VISIT HI MDM: CPT

## 2024-11-30 PROCEDURE — 700102 HCHG RX REV CODE 250 W/ 637 OVERRIDE(OP): Performed by: EMERGENCY MEDICINE

## 2024-11-30 PROCEDURE — 85055 RETICULATED PLATELET ASSAY: CPT

## 2024-11-30 PROCEDURE — 73610 X-RAY EXAM OF ANKLE: CPT | Mod: RT

## 2024-11-30 PROCEDURE — 0241U HCHG SARS-COV-2 COVID-19 NFCT DS RESP RNA 4 TRGT MIC: CPT

## 2024-11-30 PROCEDURE — HZ2ZZZZ DETOXIFICATION SERVICES FOR SUBSTANCE ABUSE TREATMENT: ICD-10-PCS | Performed by: HOSPITALIST

## 2024-11-30 PROCEDURE — 83036 HEMOGLOBIN GLYCOSYLATED A1C: CPT

## 2024-11-30 PROCEDURE — 80053 COMPREHEN METABOLIC PANEL: CPT

## 2024-11-30 PROCEDURE — 700105 HCHG RX REV CODE 258: Performed by: HOSPITALIST

## 2024-11-30 PROCEDURE — 84484 ASSAY OF TROPONIN QUANT: CPT

## 2024-11-30 PROCEDURE — 70450 CT HEAD/BRAIN W/O DYE: CPT

## 2024-11-30 PROCEDURE — A9270 NON-COVERED ITEM OR SERVICE: HCPCS | Performed by: HOSPITALIST

## 2024-11-30 PROCEDURE — 93005 ELECTROCARDIOGRAM TRACING: CPT

## 2024-11-30 PROCEDURE — 700102 HCHG RX REV CODE 250 W/ 637 OVERRIDE(OP): Performed by: HOSPITALIST

## 2024-11-30 PROCEDURE — 93005 ELECTROCARDIOGRAM TRACING: CPT | Performed by: EMERGENCY MEDICINE

## 2024-11-30 RX ORDER — MAGNESIUM SULFATE HEPTAHYDRATE 40 MG/ML
2 INJECTION, SOLUTION INTRAVENOUS ONCE
Status: COMPLETED | OUTPATIENT
Start: 2024-11-30 | End: 2024-11-30

## 2024-11-30 RX ORDER — CHLORDIAZEPOXIDE HYDROCHLORIDE 25 MG/1
25 CAPSULE, GELATIN COATED ORAL EVERY 6 HOURS
Status: DISCONTINUED | OUTPATIENT
Start: 2024-12-01 | End: 2024-12-02 | Stop reason: HOSPADM

## 2024-11-30 RX ORDER — ASPIRIN 81 MG/1
324 TABLET, CHEWABLE ORAL ONCE
Status: COMPLETED | OUTPATIENT
Start: 2024-11-30 | End: 2024-11-30

## 2024-11-30 RX ORDER — CLONIDINE HYDROCHLORIDE 0.1 MG/1
0.1 TABLET ORAL
Status: DISCONTINUED | OUTPATIENT
Start: 2024-11-30 | End: 2024-12-02 | Stop reason: HOSPADM

## 2024-11-30 RX ORDER — FOLIC ACID 1 MG/1
1 TABLET ORAL DAILY
Status: DISCONTINUED | OUTPATIENT
Start: 2024-11-30 | End: 2024-12-01

## 2024-11-30 RX ORDER — GAUZE BANDAGE 2" X 2"
100 BANDAGE TOPICAL DAILY
Status: DISCONTINUED | OUTPATIENT
Start: 2024-11-30 | End: 2024-12-01

## 2024-11-30 RX ORDER — DIAZEPAM 5 MG/1
5 TABLET ORAL ONCE
Status: COMPLETED | OUTPATIENT
Start: 2024-11-30 | End: 2024-11-30

## 2024-11-30 RX ORDER — PHENOBARBITAL 32.4 MG/1
64.8 TABLET ORAL ONCE
Status: COMPLETED | OUTPATIENT
Start: 2024-11-30 | End: 2024-11-30

## 2024-11-30 RX ORDER — ACETAMINOPHEN 325 MG/1
650 TABLET ORAL EVERY 6 HOURS PRN
Status: DISCONTINUED | OUTPATIENT
Start: 2024-11-30 | End: 2024-12-02 | Stop reason: HOSPADM

## 2024-11-30 RX ORDER — LORAZEPAM 1 MG/1
3 TABLET ORAL
Status: DISCONTINUED | OUTPATIENT
Start: 2024-11-30 | End: 2024-12-01

## 2024-11-30 RX ORDER — ONDANSETRON 2 MG/ML
4 INJECTION INTRAMUSCULAR; INTRAVENOUS EVERY 4 HOURS PRN
Status: DISCONTINUED | OUTPATIENT
Start: 2024-11-30 | End: 2024-12-02 | Stop reason: HOSPADM

## 2024-11-30 RX ORDER — MORPHINE SULFATE 4 MG/ML
2 INJECTION INTRAVENOUS
Status: DISCONTINUED | OUTPATIENT
Start: 2024-11-30 | End: 2024-12-01

## 2024-11-30 RX ORDER — SODIUM CHLORIDE 9 MG/ML
INJECTION, SOLUTION INTRAVENOUS CONTINUOUS
Status: DISCONTINUED | OUTPATIENT
Start: 2024-11-30 | End: 2024-12-01

## 2024-11-30 RX ORDER — AMOXICILLIN 250 MG
2 CAPSULE ORAL EVERY EVENING
Status: DISCONTINUED | OUTPATIENT
Start: 2024-11-30 | End: 2024-12-02 | Stop reason: HOSPADM

## 2024-11-30 RX ORDER — LORAZEPAM 1 MG/1
2 TABLET ORAL
Status: DISCONTINUED | OUTPATIENT
Start: 2024-11-30 | End: 2024-12-01

## 2024-11-30 RX ORDER — LORAZEPAM 1 MG/1
4 TABLET ORAL
Status: DISCONTINUED | OUTPATIENT
Start: 2024-11-30 | End: 2024-12-01

## 2024-11-30 RX ORDER — GAUZE BANDAGE 2" X 2"
100 BANDAGE TOPICAL ONCE
Status: COMPLETED | OUTPATIENT
Start: 2024-11-30 | End: 2024-11-30

## 2024-11-30 RX ORDER — LORAZEPAM 2 MG/ML
2 INJECTION INTRAMUSCULAR
Status: DISCONTINUED | OUTPATIENT
Start: 2024-11-30 | End: 2024-12-01

## 2024-11-30 RX ORDER — LORAZEPAM 2 MG/ML
0.5 INJECTION INTRAMUSCULAR EVERY 4 HOURS PRN
Status: DISCONTINUED | OUTPATIENT
Start: 2024-11-30 | End: 2024-12-01

## 2024-11-30 RX ORDER — LORAZEPAM 2 MG/ML
1 INJECTION INTRAMUSCULAR
Status: DISCONTINUED | OUTPATIENT
Start: 2024-11-30 | End: 2024-12-01

## 2024-11-30 RX ORDER — OXYCODONE HYDROCHLORIDE 5 MG/1
2.5 TABLET ORAL
Status: DISCONTINUED | OUTPATIENT
Start: 2024-11-30 | End: 2024-12-01

## 2024-11-30 RX ORDER — TELMISARTAN 40 MG/1
80 TABLET ORAL
Status: DISCONTINUED | OUTPATIENT
Start: 2024-12-01 | End: 2024-12-02 | Stop reason: HOSPADM

## 2024-11-30 RX ORDER — LORAZEPAM 1 MG/1
1 TABLET ORAL EVERY 4 HOURS PRN
Status: DISCONTINUED | OUTPATIENT
Start: 2024-11-30 | End: 2024-12-01

## 2024-11-30 RX ORDER — CHLORDIAZEPOXIDE HYDROCHLORIDE 25 MG/1
50 CAPSULE, GELATIN COATED ORAL EVERY 6 HOURS
Status: COMPLETED | OUTPATIENT
Start: 2024-11-30 | End: 2024-12-01

## 2024-11-30 RX ORDER — ONDANSETRON 4 MG/1
4 TABLET, ORALLY DISINTEGRATING ORAL EVERY 4 HOURS PRN
Status: DISCONTINUED | OUTPATIENT
Start: 2024-11-30 | End: 2024-12-02 | Stop reason: HOSPADM

## 2024-11-30 RX ORDER — LANOLIN ALCOHOL/MO/W.PET/CERES
400 CREAM (GRAM) TOPICAL 2 TIMES DAILY
Status: DISCONTINUED | OUTPATIENT
Start: 2024-12-01 | End: 2024-12-02 | Stop reason: HOSPADM

## 2024-11-30 RX ORDER — LORAZEPAM 2 MG/ML
1.5 INJECTION INTRAMUSCULAR
Status: DISCONTINUED | OUTPATIENT
Start: 2024-11-30 | End: 2024-12-01

## 2024-11-30 RX ORDER — OXYCODONE HYDROCHLORIDE 5 MG/1
5 TABLET ORAL
Status: DISCONTINUED | OUTPATIENT
Start: 2024-11-30 | End: 2024-12-01

## 2024-11-30 RX ORDER — LORAZEPAM 0.5 MG/1
0.5 TABLET ORAL EVERY 4 HOURS PRN
Status: DISCONTINUED | OUTPATIENT
Start: 2024-11-30 | End: 2024-12-01

## 2024-11-30 RX ORDER — AMLODIPINE BESYLATE 5 MG/1
5 TABLET ORAL
Status: DISCONTINUED | OUTPATIENT
Start: 2024-12-01 | End: 2024-12-02 | Stop reason: HOSPADM

## 2024-11-30 RX ORDER — POLYETHYLENE GLYCOL 3350 17 G/17G
1 POWDER, FOR SOLUTION ORAL
Status: DISCONTINUED | OUTPATIENT
Start: 2024-11-30 | End: 2024-12-02 | Stop reason: HOSPADM

## 2024-11-30 RX ORDER — HEPARIN SODIUM 5000 [USP'U]/ML
5000 INJECTION, SOLUTION INTRAVENOUS; SUBCUTANEOUS EVERY 8 HOURS
Status: DISCONTINUED | OUTPATIENT
Start: 2024-11-30 | End: 2024-12-02 | Stop reason: HOSPADM

## 2024-11-30 RX ORDER — LABETALOL HYDROCHLORIDE 5 MG/ML
10 INJECTION, SOLUTION INTRAVENOUS EVERY 4 HOURS PRN
Status: DISCONTINUED | OUTPATIENT
Start: 2024-11-30 | End: 2024-12-02 | Stop reason: HOSPADM

## 2024-11-30 RX ADMIN — Medication 100 MG: at 16:57

## 2024-11-30 RX ADMIN — THERA TABS 1 TABLET: TAB at 16:57

## 2024-11-30 RX ADMIN — Medication 100 MG: at 13:05

## 2024-11-30 RX ADMIN — PHENOBARBITAL 64.8 MG: 32.4 TABLET ORAL at 14:36

## 2024-11-30 RX ADMIN — CHLORDIAZEPOXIDE HYDROCHLORIDE 50 MG: 25 CAPSULE ORAL at 17:04

## 2024-11-30 RX ADMIN — DIAZEPAM 5 MG: 5 TABLET ORAL at 12:03

## 2024-11-30 RX ADMIN — MAGNESIUM SULFATE HEPTAHYDRATE 2 G: 2 INJECTION, SOLUTION INTRAVENOUS at 16:57

## 2024-11-30 RX ADMIN — LORAZEPAM 0.5 MG: 0.5 TABLET ORAL at 17:08

## 2024-11-30 RX ADMIN — HEPARIN SODIUM 5000 UNITS: 5000 INJECTION, SOLUTION INTRAVENOUS; SUBCUTANEOUS at 23:06

## 2024-11-30 RX ADMIN — ASPIRIN 324 MG: 81 TABLET, CHEWABLE ORAL at 15:09

## 2024-11-30 RX ADMIN — FOLIC ACID 1 MG: 1 TABLET ORAL at 16:57

## 2024-11-30 RX ADMIN — CHLORDIAZEPOXIDE HYDROCHLORIDE 50 MG: 25 CAPSULE ORAL at 23:06

## 2024-11-30 RX ADMIN — HEPARIN SODIUM 5000 UNITS: 5000 INJECTION, SOLUTION INTRAVENOUS; SUBCUTANEOUS at 17:00

## 2024-11-30 RX ADMIN — SODIUM CHLORIDE: 9 INJECTION, SOLUTION INTRAVENOUS at 16:55

## 2024-11-30 ASSESSMENT — LIFESTYLE VARIABLES
SUBSTANCE_ABUSE: 1
AGITATION: NORMAL ACTIVITY
EVER HAD A DRINK FIRST THING IN THE MORNING TO STEADY YOUR NERVES TO GET RID OF A HANGOVER: NO
TREMOR: TREMOR NOT VISIBLE BUT CAN BE FELT, FINGERTIP TO FINGERTIP
HEADACHE, FULLNESS IN HEAD: NOT PRESENT
TOTAL SCORE: 7
VISUAL DISTURBANCES: VERY MILD SENSITIVITY
HAVE PEOPLE ANNOYED YOU BY CRITICIZING YOUR DRINKING: YES
ORIENTATION AND CLOUDING OF SENSORIUM: ORIENTED AND CAN DO SERIAL ADDITIONS
ANXIETY: MILDLY ANXIOUS
TREMOR: *
PAROXYSMAL SWEATS: BARELY PERCEPTIBLE SWEATING. PALMS MOIST
NAUSEA AND VOMITING: NO NAUSEA AND NO VOMITING
AUDITORY DISTURBANCES: NOT PRESENT
ORIENTATION AND CLOUDING OF SENSORIUM: ORIENTED AND CAN DO SERIAL ADDITIONS
EVER FELT BAD OR GUILTY ABOUT YOUR DRINKING: YES
TOTAL SCORE: 3
VISUAL DISTURBANCES: NOT PRESENT
AGITATION: NORMAL ACTIVITY
HAVE YOU EVER FELT YOU SHOULD CUT DOWN ON YOUR DRINKING: YES
ANXIETY: *
TOTAL SCORE: 3
ON A TYPICAL DAY WHEN YOU DRINK ALCOHOL HOW MANY DRINKS DO YOU HAVE: 3
AVERAGE NUMBER OF DAYS PER WEEK YOU HAVE A DRINK CONTAINING ALCOHOL: 7
DOES PATIENT WANT TO TALK TO SOMEONE ABOUT QUITTING: NO
NAUSEA AND VOMITING: NO NAUSEA AND NO VOMITING
CONSUMPTION TOTAL: POSITIVE
TOTAL SCORE: 3
AUDITORY DISTURBANCES: NOT PRESENT
PAROXYSMAL SWEATS: BARELY PERCEPTIBLE SWEATING. PALMS MOIST
DOES PATIENT WANT TO STOP DRINKING: YES
TOTAL SCORE: 3
HEADACHE, FULLNESS IN HEAD: NOT PRESENT
HOW MANY TIMES IN THE PAST YEAR HAVE YOU HAD 5 OR MORE DRINKS IN A DAY: 365
ALCOHOL_USE: YES

## 2024-11-30 ASSESSMENT — ENCOUNTER SYMPTOMS
PALPITATIONS: 0
CONSTITUTIONAL NEGATIVE: 1
VOMITING: 0
DIARRHEA: 0
FOCAL WEAKNESS: 0
FEVER: 0
DOUBLE VISION: 0
BLURRED VISION: 0
NAUSEA: 0
EYES NEGATIVE: 1
BRUISES/BLEEDS EASILY: 0
DIZZINESS: 0
DIAPHORESIS: 0
SEIZURES: 0
WHEEZING: 0
CARDIOVASCULAR NEGATIVE: 1
ABDOMINAL PAIN: 0
BLOOD IN STOOL: 0
NERVOUS/ANXIOUS: 0
COUGH: 0
LOSS OF CONSCIOUSNESS: 1
HEARTBURN: 0
CONSTIPATION: 0
CHILLS: 0
SORE THROAT: 0
HEADACHES: 1
RESPIRATORY NEGATIVE: 1
HEMOPTYSIS: 0
GASTROINTESTINAL NEGATIVE: 1

## 2024-11-30 ASSESSMENT — COGNITIVE AND FUNCTIONAL STATUS - GENERAL
STANDING UP FROM CHAIR USING ARMS: A LITTLE
CLIMB 3 TO 5 STEPS WITH RAILING: A LOT
SUGGESTED CMS G CODE MODIFIER DAILY ACTIVITY: CK
TURNING FROM BACK TO SIDE WHILE IN FLAT BAD: A LITTLE
PERSONAL GROOMING: A LITTLE
EATING MEALS: A LITTLE
CLIMB 3 TO 5 STEPS WITH RAILING: A LOT
DRESSING REGULAR LOWER BODY CLOTHING: A LITTLE
SUGGESTED CMS G CODE MODIFIER DAILY ACTIVITY: CK
HELP NEEDED FOR BATHING: A LITTLE
WALKING IN HOSPITAL ROOM: A LOT
MOVING TO AND FROM BED TO CHAIR: A LITTLE
TOILETING: A LOT
SUGGESTED CMS G CODE MODIFIER MOBILITY: CK
MOBILITY SCORE: 18
WALKING IN HOSPITAL ROOM: A LOT
HELP NEEDED FOR BATHING: A LITTLE
SUGGESTED CMS G CODE MODIFIER MOBILITY: CK
MOVING FROM LYING ON BACK TO SITTING ON SIDE OF FLAT BED: A LITTLE
DRESSING REGULAR UPPER BODY CLOTHING: A LITTLE
MOVING TO AND FROM BED TO CHAIR: A LITTLE
TOILETING: A LITTLE
EATING MEALS: A LITTLE
PERSONAL GROOMING: A LITTLE
DAILY ACTIVITIY SCORE: 18
STANDING UP FROM CHAIR USING ARMS: A LITTLE
MOBILITY SCORE: 16
DRESSING REGULAR LOWER BODY CLOTHING: A LITTLE
DAILY ACTIVITIY SCORE: 17
DRESSING REGULAR UPPER BODY CLOTHING: A LITTLE

## 2024-11-30 ASSESSMENT — FIBROSIS 4 INDEX
FIB4 SCORE: 12.5
FIB4 SCORE: 2.15

## 2024-11-30 ASSESSMENT — PAIN DESCRIPTION - PAIN TYPE
TYPE: ACUTE PAIN
TYPE: ACUTE PAIN

## 2024-11-30 ASSESSMENT — VISUAL ACUITY: OU: 1

## 2024-11-30 ASSESSMENT — PATIENT HEALTH QUESTIONNAIRE - PHQ9
1. LITTLE INTEREST OR PLEASURE IN DOING THINGS: NOT AT ALL
SUM OF ALL RESPONSES TO PHQ9 QUESTIONS 1 AND 2: 0
2. FEELING DOWN, DEPRESSED, IRRITABLE, OR HOPELESS: NOT AT ALL

## 2024-11-30 NOTE — ASSESSMENT & PLAN NOTE
Secondary to alcohol abuse patient has SIADH with hyponatremia down to 127  Continue to monitor this could be contributing to her seizures  Give sodium replenishment by giving normal saline infusion at 83 mL/h for 2 L.  Check sodium levels every 6 hours

## 2024-11-30 NOTE — ASSESSMENT & PLAN NOTE
Patient over the holidays has not been able to have access to alcohol and because of this stopped drinking and has gone into an alcohol withdrawal seizure.  Seizure precautions  Initiate the patient on Librium for alcohol withdrawal management which will also control her seizures  Thiamine folic acid multivitamin  Monitor magnesium phosphorus and potassium levels and supplement as needed  Counseling and education on alcohol cessation.

## 2024-11-30 NOTE — ASSESSMENT & PLAN NOTE
Secondary to alcohol excess abuse the patient has elevated liver enzymes  Continue to monitor liver function tests

## 2024-11-30 NOTE — ASSESSMENT & PLAN NOTE
Optimize blood pressure management keep systolic blood pressure less than 140 and the diastolic under 95  Norvasc 5 mg daily, telmisartan 80 mg daily  As needed labetalol and clonidine

## 2024-11-30 NOTE — ASSESSMENT & PLAN NOTE
Outpatient weight loss management program lifestyle modification highly recommended  Her thyroid functions are abnormal and this could be contributing to her obesity.  Body mass index is 33.29 kg/m².

## 2024-11-30 NOTE — ASSESSMENT & PLAN NOTE
Currently not on thyroid supplementation  Check TSH T3 and T4 levels  From 1 year ago her TSH was 5.450

## 2024-11-30 NOTE — ED NOTES
Pharmacy Medication Reconciliation      ~Medication reconciliation updated and complete per patient at bedside   ~Allergies have been verified and updated   ~No oral ABX within the last 30 days  ~Patient home pharmacy :  [facility]      ~Anticoagulants (rivaroxaban, apixaban, edoxaban, dabigatran, warfarin, enoxaparin) taken in the last 14 days? No

## 2024-11-30 NOTE — ED PROVIDER NOTES
ED Provider Note        CHIEF COMPLAINT  Chief Complaint   Patient presents with    Syncope     Past out at home    found pt on floor in kitchen this morning  unsure LOC for about 5-10 minutes per   no Hx of such    pt does not remember or why this incident happened   twist R ankle as well  hurt it today   head injury  is not on bld thinner   pt is a daily drinking   unsure if she has been drinking today  hit head when she past out          HPI    OUTSIDE HISTORIAN(S):  Significant other provides supporting history    Mary Young is a 64 y.o. female who presents to the Emergency Department after an episode of unresponsiveness.  The patient reports that she was in the kitchen doing work at the sink.  She does not remember anything further.  She denies any complaints other than Right ankle pain.  She has been able to put some weight on it but reports it hurts on the lateral aspect of the right ankle.    The  reports that he walked into the kitchen and found her on the ground, initially unresponsive.  She then appeared to startle awake but had a period of approximately 15 minutes where she seemed to be confused before she came to back to her normal self.      The patient denies any chest pain, shortness of breath.  She denies any history of cardiac troubles, prior syncope, alcohol withdrawal seizures.  She does generally drink every day but the past few days has not been drinking because she has not had access to alcohol.  She recently has had some vomiting diarrhea as well.    REVIEW OF SYSTEMS  See HPI for further details. All other systems are negative.     PAST MEDICAL HISTORY     Past Medical History:   Diagnosis Date    Acute vaginitis 1/23/2018       SURGICAL HISTORY  History reviewed. No pertinent surgical history.    FAMILY HISTORY  Family History   Problem Relation Age of Onset    Hypertension Mother     Hypertension Father        SOCIAL HISTORY    reports that she has never  "smoked. She has never used smokeless tobacco. She reports current alcohol use. She reports that she does not use drugs.    CURRENT MEDICATIONS  Home Medications       Reviewed by Misti Alvarez (Pharmacy Tech) on 24 at 1524  Med List Status: Complete     Medication Last Dose Status   Ascorbic Acid (VITAMIN C PO) 2024 Active   CALCIUM PO 2024 Active   CHOLECALCIFEROL PO 2024 Active   Telmisartan-amLODIPine 80-5 MG Tab 2024 Active                  Audit from Redirected Encounters    **Home medications have not yet been reviewed for this encounter**         ALLERGIES  Allergies   Allergen Reactions    Lisinopril Cough     Cough       PHYSICAL EXAM  VITAL SIGNS: /69   Pulse 78   Temp 37.6 °C (99.6 °F) (Temporal)   Resp 20   Ht 1.575 m (5' 2\")   Wt 82.6 kg (182 lb)   LMP 2015   SpO2 94%   BMI 33.29 kg/m²   Gen: Alert, no acute distress  HEENT: ATNC, tremulous tongue, dry mucous membrane  Eyes: PERRL, EOMI, normal conjunctiva  Neck: trachea midline, no meningismus  Resp: no respiratory distress, clear to auscultation bilaterally  CV: No JVD, regular rate and rhythm, no murmurs, rubs, gallops  Abd: non-distended, soft, nontender  Ext: Tenderness over ATFL region of right ankle with associated swelling, otherwise no deformities, nontender  Back: No tenderness  Neuro: speech fluent, GCS 15, cranial nerves II through XII intact, normal cerebellar function.    DIAGNOSTIC STUDIES / PROCEDURES  EKG  Results for orders placed or performed during the hospital encounter of 24   EKG   Result Value Ref Range    Report       [facility] Emergency Dept.    Test Date:  2024  Pt Name:    GALINA HALEY               Department: [facility]  MRN:        [id number]                      Room:  Gender:     Female                       Technician: [id number]  :        1960                   Requested By:ER TRIAGE PROTOCOL  Order #:    [id number]                 "    Reading MD: Dallas Mayorga    Measurements  Intervals                                Axis  Rate:       96                           P:          51  FL:         139                          QRS:        113  QRSD:       94                           T:          28  QT:         369  QTc:        467    Interpretive Statements  Sinus rhythm  Right axis deviation  Abnormal inferior Q waves  No previous ECG available for comparison  Electronically Signed On 11- 11:36:26 PST by Dallas Mayorga       I independently interpreted this EKG    LABS  Labs Reviewed   CBC WITH DIFFERENTIAL - Abnormal; Notable for the following components:       Result Value    WBC 3.5 (*)     MCH 33.2 (*)     Platelet Count 73 (*)     Neutrophils-Polys 81.70 (*)     Lymphocytes 7.10 (*)     Lymphs (Absolute) 0.25 (*)     All other components within normal limits   COMP METABOLIC PANEL - Abnormal; Notable for the following components:    Sodium 129 (*)     Chloride 88 (*)     Anion Gap 20.0 (*)     Glucose 135 (*)     AST(SGOT) 177 (*)     ALT(SGPT) 159 (*)     Globulin 3.6 (*)     All other components within normal limits   TROPONIN - Abnormal; Notable for the following components:    Troponin T 25 (*)     All other components within normal limits   LACTIC ACID - Abnormal; Notable for the following components:    Lactic Acid 2.1 (*)     All other components within normal limits   TROPONIN - Abnormal; Notable for the following components:    Troponin T 28 (*)     All other components within normal limits   TROPONIN - Abnormal; Notable for the following components:    Troponin T 33 (*)     All other components within normal limits   ETHYL ALCOHOL (BLOOD)   PLATELET ESTIMATE   MORPHOLOGY   IMMATURE PLT FRACTION   ESTIMATED GFR   DIAGNOSTIC ALCOHOL   PROLACTIN   LACTIC ACID   TROPONIN   HEMOGLOBIN A1C   TSH WITH REFLEX TO FT4   T3 FREE   COV-2, FLU A/B, AND RSV BY PCR (CEPHEID)   SODIUM SERUM (NA)         RADIOLOGY  I have independently interpreted the  diagnostic imaging associated with this visit.  My preliminary interpretation is as follows: X-ray ankle: No fracture  Radiologist interpretation:    CT-HEAD W/O   Final Result         NO ACUTE ABNORMALITIES ARE NOTED ON CT SCAN OF THE HEAD.      Findings are consistent with atrophy.  Decreased attenuation in the periventricular white matter likely indicates microvascular ischemic disease.               DX-ANKLE 3+ VIEWS RIGHT   Final Result         Small mildly displaced fracture fragments in the dorsal aspect of the talus and anterior distal tibia could be acute. Correlate with point tenderness.      Small tibiotalar joint effusion.          COURSE & MEDICAL DECISION MAKING  Pertinent Labs & Imaging studies were reviewed. (See chart for details)      EXTERNAL RECORDS REVIEWED  Outpatient Notes most recent outpatient note 5/10/2024 for bilateral low back pain      INITIAL ASSESSMENT AND PLAN  Care Narrative: Patient presents with an episode of unresponsiveness.  On arrival she is neuro intact.  No obvious murmurs for valvular etiology of potential syncope.  No obvious stigmata of DVT/PE.  It sounds like she did have a postictal period most consistent with a seizure.  She does have slight tremor and tongue tremor consistent with potential alcohol withdrawal in the setting of not drinking over the past few days.  She does not have a history of alcohol withdrawal seizures but has felt shakes in the past from quitting alcohol.  Patient treated with diazepam for this.    Troponin borderline elevated.    EKG demonstrates no high risk features for cardiac syncope, including ischemia, high-grade heart block, Brugada syndrome, Alicia-Parkinson-White syndrome, arrhythmogenic right ventricular dysplasia, long QT, short QT, hypertrophic obstructive cardiomyopathy.    Patient is metabolic panel demonstrates borderline hyponatremia with an elevated anion gap, unclear whether from the patient's dehydration in the setting of  vomiting and diarrhea versus an alcoholic ketosis picture/beer potomania.  No clinical signs of meningitis.    Patient has no chest pain, neck pain, or headache that would suggest arterial dissection.    Initial troponin indeterminate, repeat troponin slightly elevated.  An additional repeat was performed which now demonstrates a clear trend towards escalating troponin.  At this point time, recommend hospitalization for further cardiac evaluation.  Patient is agreeable to this.  She was provided with chewable aspirin for this.    ADDITIONAL PROBLEM LIST AND DISPOSITION      I have discussed management of the patient with the following medical professionals: See below      Decision tools and prescription drugs considered including, but not limited to:  Heart score:6 .      Patient is referred to primary care provider for blood pressure, diabetes and all other preventative health services.  Patient was given return precautions, anticipatory guidance, and the opportunity ask questions prior to discharge        FINAL IMPRESSION  1. Alcohol withdrawal seizure without complication (HCC)    2. Injury of right ankle, initial encounter    3. Elevated troponin           DISPOSITION:      Case discussed with Dr. Owen , who will evaluate the patient for hospitalization. Patient will be hospitalized in guarded condition.  This dictation was created using voice recognition software. The accuracy of the dictation is limited to the abilities of the software. I expect there may be some errors of grammar and possibly content. The nursing notes were reviewed and certain aspects of this information were incorporated into this note.

## 2024-11-30 NOTE — ASSESSMENT & PLAN NOTE
Most likely stress response although the patient most likely has prediabetes  Check hemoglobin A1c level  Currently blood sugars elevated 134

## 2024-11-30 NOTE — ASSESSMENT & PLAN NOTE
In the process of the fall the patient also developed right ankle pain  Imaging was done  Small mildly displaced fracture fragments in the dorsal aspect of the talus and anterior distal tibia could be acute  Continue pain management   Fracture does not appear to be one that orthopedics and correct.

## 2024-11-30 NOTE — ASSESSMENT & PLAN NOTE
Mild leukopenia 3.5  Currently does not need isolation  Lymphocytes are is down to 0.25  Check a COVID-19 test

## 2024-11-30 NOTE — DISCHARGE INSTRUCTIONS
You were seen in the emergency department for an episode of unresponsiveness.  This appears to fit most consistent with an alcohol withdrawal seizure.  You have been given medication and will slowly taper over the next few days to help prevent this from recurring, as well as for further withdrawal symptoms.    We do recommend stopping your alcohol use.    Some people can have dangerous alcohol withdrawal that can be life-threatening.  There is inpatient detox available to [facility].  You may also return to the emergency department if you develop any worsening symptoms.    For the x-ray of your ankle, there are 2 bony fragments, but it is unclear if this represents a fracture or something that has been there for a while.  You are being provided with crutches and an air brace.  You may put weight on your right ankle as you tolerated.    Please follow-up closely with orthopedics.  Contact the number to schedule an appointment.    Return to the emergency department or seek medical attention if you develop:  Chest pain, shortness of breath, loss of consciousness, seizure, uncontrollable shaking, agitation, vomiting, any other new or concerning findings

## 2024-11-30 NOTE — H&P
Hospital Medicine History & Physical Note    Date of Service  11/30/2024    Primary Care Physician  Joe Bruce M.D.    Consultants  None    Specialist Names: None    Code Status  Full Code    Chief Complaint  Chief Complaint   Patient presents with    Syncope     Past out at home    found pt on floor in kitchen this morning  unsure LOC for about 5-10 minutes per   no Hx of such    pt does not remember or why this incident happened   twist R ankle as well  hurt it today   head injury  is not on bld thinner   pt is a daily drinking   unsure if she has been drinking today  hit head when she past out        History of Presenting Illness  Mary Young is a 64 y.o. female who presented 11/30/2024 with unresponsiveness.  The patient at home was in the kitchen preparing to Thanksgiving turkey as she did not have time to make it to [place]. Suddenly when  came in she was on the floor laying there patient does not remember the event.  She does have a bite on her tongue.  The patient apparently did hit her head and a CAT scan was done.  The results of the CAT scan is negative for acute intracranial pathology but does show chronic atrophy mostly in the periventricular white matter.  She did develop a bruise on the left posterior region of her scalp.  This may be the consequence of alcohol excess abuse.  The patient apparently does drink quite a bit every day.  She uses a 1 gallon bottle of vodka every 2 weeks.  She is not sure if she has had some today but she believes that she has been without alcohol for several days due to the holidays.  The patient will be admitted for alcohol withdrawal management, correction of the hyponatremia, monitoring of her seizures, stabilization of her blood pressure.  In the consequence of the fall she also did injure her right ankle.  X-rays of the right ankle were done    I discussed the plan of care with patient, bedside RN, and emergency room physician   [place] Mayorga .    Review of Systems  Review of Systems   Constitutional: Negative.  Negative for chills, diaphoresis and fever.   HENT: Negative.  Negative for hearing loss, nosebleeds and sore throat.    Eyes: Negative.  Negative for blurred vision and double vision.   Respiratory: Negative.  Negative for cough, hemoptysis and wheezing.    Cardiovascular: Negative.  Negative for chest pain, palpitations and leg swelling.   Gastrointestinal: Negative.  Negative for abdominal pain, blood in stool, constipation, diarrhea, heartburn, nausea and vomiting.   Genitourinary: Negative.  Negative for frequency, hematuria and urgency.   Musculoskeletal:  Positive for joint pain (Right ankle).   Skin: Negative.  Negative for itching and rash.   Neurological:  Positive for loss of consciousness and headaches. Negative for dizziness, focal weakness and seizures.   Endo/Heme/Allergies: Negative.  Does not bruise/bleed easily.   Psychiatric/Behavioral:  Positive for substance abuse. Negative for suicidal ideas. The patient is not nervous/anxious.    All other systems reviewed and are negative.      Past Medical History   has a past medical history of Acute vaginitis (1/23/2018).    Surgical History   has no past surgical history on file.     Family History  family history includes Hypertension in her father and mother.   Family history reviewed with patient. There is no family history that is pertinent to the chief complaint.     Social History   reports that she has never smoked. She has never used smokeless tobacco. She reports current alcohol use. She reports that she does not use drugs.    Allergies  Allergies   Allergen Reactions    Lisinopril Cough     Cough       Medications  Prior to Admission Medications   Prescriptions Last Dose Informant Patient Reported? Taking?   Cholecalciferol 1.25 MG (73374 UT) Tab   Yes No   Sig: Take 1.25 mg by mouth every day.   Telmisartan-amLODIPine 80-5 MG Tab   Yes No   Sig: Take  by mouth  every day.      Facility-Administered Medications: None       Physical Exam  Temp:  [37.6 °C (99.6 °F)] 37.6 °C (99.6 °F)  Pulse:  [78-99] 78  Resp:  [14-20] 20  BP: (115-153)/(64-87) 117/69  SpO2:  [94 %-96 %] 94 %  Blood Pressure : 117/69   Temperature: 37.6 °C (99.6 °F)   Pulse: 78   Respiration: 20   Pulse Oximetry: 94 %       Physical Exam  Vitals and nursing note reviewed. Exam conducted with a chaperone present.   Constitutional:       General: She is awake.      Appearance: Normal appearance. She is well-developed and well-groomed. She is obese. She is ill-appearing.   HENT:      Head: Contusion (Left posterior region of the head) present.      Jaw: There is normal jaw occlusion. No trismus.      Salivary Glands: Right salivary gland is not tender. Left salivary gland is not tender.      Right Ear: External ear normal.      Left Ear: External ear normal.      Mouth/Throat:      Mouth: Mucous membranes are moist.      Pharynx: Oropharynx is clear.   Eyes:      General: Lids are normal. Vision grossly intact.      Extraocular Movements: Extraocular movements intact.      Conjunctiva/sclera: Conjunctivae normal.      Right eye: Right conjunctiva is not injected. No exudate.     Left eye: Left conjunctiva is not injected. No exudate.     Pupils: Pupils are equal, round, and reactive to light.   Neck:      Thyroid: No thyroid mass.      Vascular: No hepatojugular reflux or JVD.      Trachea: No abnormal tracheal secretions or tracheal deviation.   Cardiovascular:      Rate and Rhythm: Normal rate and regular rhythm. Occasional Extrasystoles are present.     Pulses: Normal pulses.      Heart sounds: Normal heart sounds. No murmur heard.     No friction rub.   Pulmonary:      Effort: Pulmonary effort is normal.      Breath sounds: Examination of the right-lower field reveals decreased breath sounds. Examination of the left-lower field reveals decreased breath sounds. Decreased breath sounds present. No wheezing or  rhonchi.   Abdominal:      General: Abdomen is flat.      Palpations: Abdomen is soft.      Tenderness: There is no abdominal tenderness. There is no right CVA tenderness or left CVA tenderness.      Hernia: No hernia is present.   Musculoskeletal:         General: Tenderness (Right ankle) present.      Cervical back: Full passive range of motion without pain, normal range of motion and neck supple. No rigidity. No muscular tenderness.      Right lower leg: No edema.      Left lower leg: No edema.   Lymphadenopathy:      Head:      Right side of head: No submental adenopathy.      Left side of head: No submental adenopathy.      Cervical:      Right cervical: No superficial cervical adenopathy.     Left cervical: No superficial cervical adenopathy.      Upper Body:      Right upper body: No supraclavicular adenopathy.      Left upper body: No supraclavicular adenopathy.   Skin:     General: Skin is warm and dry.      Capillary Refill: Capillary refill takes 2 to 3 seconds.      Coloration: Skin is not cyanotic or pale.      Findings: No abrasion or bruising.   Neurological:      General: No focal deficit present.      Mental Status: She is alert and oriented to person, place, and time. Mental status is at baseline.      GCS: GCS eye subscore is 4. GCS verbal subscore is 5. GCS motor subscore is 6.      Cranial Nerves: No cranial nerve deficit.      Sensory: No sensory deficit.      Motor: Motor function is intact.      Deep Tendon Reflexes:      Reflex Scores:       Tricep reflexes are 2+ on the right side and 2+ on the left side.       Bicep reflexes are 2+ on the right side and 2+ on the left side.       Brachioradialis reflexes are 2+ on the right side and 2+ on the left side.       Patellar reflexes are 2+ on the right side and 2+ on the left side.       Achilles reflexes are 2+ on the right side and 2+ on the left side.  Psychiatric:         Attention and Perception: Attention and perception normal.          "Mood and Affect: Mood normal.         Speech: Speech normal.         Behavior: Behavior is cooperative.         Thought Content: Thought content normal.         Cognition and Memory: Cognition and memory normal.         Judgment: Judgment normal.         Laboratory:  Recent Labs     11/30/24  1132   WBC 3.5*   RBC 4.61   HEMOGLOBIN 15.3   HEMATOCRIT 43.7   MCV 94.8   MCH 33.2*   MCHC 35.0   RDW 41.1   PLATELETCT 73*   MPV 10.0     Recent Labs     11/30/24  1132   SODIUM 129*   POTASSIUM 4.1   CHLORIDE 88*   CO2 21   GLUCOSE 135*   BUN 11   CREATININE 0.82   CALCIUM 9.9     Recent Labs     11/30/24  1132   ALTSGPT 159*   ASTSGOT 177*   ALKPHOSPHAT 90   TBILIRUBIN 0.6   GLUCOSE 135*         No results for input(s): \"NTPROBNP\" in the last 72 hours.      Recent Labs     11/30/24  1132 11/30/24  1305 11/30/24  1410   TROPONINT 25* 28* 33*       Imaging:  CT-HEAD W/O   Final Result         NO ACUTE ABNORMALITIES ARE NOTED ON CT SCAN OF THE HEAD.      Findings are consistent with atrophy.  Decreased attenuation in the periventricular white matter likely indicates microvascular ischemic disease.               DX-ANKLE 3+ VIEWS RIGHT   Final Result         Small mildly displaced fracture fragments in the dorsal aspect of the talus and anterior distal tibia could be acute. Correlate with point tenderness.      Small tibiotalar joint effusion.          X-Ray:  I have personally reviewed the images and compared with prior images.  EKG:  I have personally reviewed the images and compared with prior images.    Assessment/Plan:  Justification for Admission Status  I anticipate this patient will require at least two midnights for appropriate medical management, necessitating inpatient admission because patient is experiencing alcohol withdrawal management with seizures and will require at least 48 hours of inpatient management    Patient will need a Telemetry bed on MEDICAL service .  The need is secondary to alcohol withdrawal " management with seizures.    * Alcohol withdrawal seizure with complication (HCC)- (present on admission)  Assessment & Plan  Patient over the holidays has not been able to have access to alcohol and because of this stopped drinking and has gone into an alcohol withdrawal seizure.  Seizure precautions  Initiate the patient on Librium for alcohol withdrawal management which will also control her seizures  Thiamine folic acid multivitamin  Monitor magnesium phosphorus and potassium levels and supplement as needed  Counseling and education on alcohol cessation.    Right ankle pain- (present on admission)  Assessment & Plan  In the process of the fall the patient also developed right ankle pain  Imaging was done  Small mildly displaced fracture fragments in the dorsal aspect of the talus and anterior distal tibia could be acute  Continue pain management   Fracture does not appear to be one that orthopedics and correct.      Hyperglycemia- (present on admission)  Assessment & Plan  Most likely stress response although the patient most likely has prediabetes  Check hemoglobin A1c level  Currently blood sugars elevated 134    Elevated liver enzymes- (present on admission)  Assessment & Plan  Secondary to alcohol excess abuse the patient has elevated liver enzymes  Continue to monitor liver function tests    Hyponatremia- (present on admission)  Assessment & Plan  Secondary to alcohol abuse patient has SIADH with hyponatremia down to 127  Continue to monitor this could be contributing to her seizures  Give sodium replenishment by giving normal saline infusion at 83 mL/h for 2 L.  Check sodium levels every 6 hours    Leukopenia- (present on admission)  Assessment & Plan  Mild leukopenia 3.5  Currently does not need isolation  Lymphocytes are is down to 0.25  Check a COVID-19 test    Essential hypertension- (present on admission)  Assessment & Plan  Optimize blood pressure management keep systolic blood pressure less than 140  and the diastolic under 95  Norvasc 5 mg daily, telmisartan 80 mg daily  As needed labetalol and clonidine    Subclinical hypothyroidism- (present on admission)  Assessment & Plan  Currently not on thyroid supplementation  Check TSH T3 and T4 levels  From 1 year ago her TSH was 5.450    Hyperlipidemia- (present on admission)  Assessment & Plan  Check a fasting lipid panel  Currently not on statin.    Obesity (BMI 30-39.9)- (present on admission)  Assessment & Plan  Outpatient weight loss management program lifestyle modification highly recommended  Her thyroid functions are abnormal and this could be contributing to her obesity.  Body mass index is 33.29 kg/m².          VTE prophylaxis: SCDs/TEDs

## 2024-11-30 NOTE — ED TRIAGE NOTES
"/87   Pulse 99   Temp 37.6 °C (99.6 °F) (Temporal)   Resp 18   Ht 1.575 m (5' 2\")   Wt 82.6 kg (182 lb)   LMP 03/06/2015   SpO2 95%   BMI 33.29 kg/m²   Chief Complaint   Patient presents with    Syncope     Past out at home    found pt on floor in kitchen this morning  unsure LOC for about 5-10 minutes per   no Hx of such    pt does not remember or why this incident happened   twist R ankle as well  hurt it today   head injury  is not on bld thinner   pt is a daily drinking   unsure if she has been drinking today  hit head when she past out      Comes in w/ family   pt when she awoke was confused now is remembering incident     c/o R ankle pain when ambulating  denies head pain where he hit the counter/sink area in her home   EKG done in triage   "

## 2024-12-01 ENCOUNTER — APPOINTMENT (OUTPATIENT)
Dept: RADIOLOGY | Facility: MEDICAL CENTER | Age: 64
DRG: 894 | End: 2024-12-01
Attending: INTERNAL MEDICINE
Payer: COMMERCIAL

## 2024-12-01 PROBLEM — B37.2 INTERTRIGINOUS CANDIDIASIS: Status: ACTIVE | Noted: 2024-12-01

## 2024-12-01 PROBLEM — K70.9 ALCOHOL LIVER DAMAGE (HCC): Status: ACTIVE | Noted: 2024-12-01

## 2024-12-01 PROBLEM — G31.9 CEREBRAL ATROPHY (HCC): Status: ACTIVE | Noted: 2024-12-01

## 2024-12-01 PROBLEM — E87.6 HYPOKALEMIA: Status: ACTIVE | Noted: 2024-12-01

## 2024-12-01 PROBLEM — D69.6 THROMBOCYTOPENIA (HCC): Status: ACTIVE | Noted: 2024-12-01

## 2024-12-01 LAB
ALBUMIN SERPL BCP-MCNC: 3.8 G/DL (ref 3.2–4.9)
ALP SERPL-CCNC: 74 U/L (ref 30–99)
ALT SERPL-CCNC: 101 U/L (ref 2–50)
AMMONIA PLAS-SCNC: 21 UMOL/L (ref 11–45)
ANION GAP SERPL CALC-SCNC: 16 MMOL/L (ref 7–16)
AST SERPL-CCNC: 86 U/L (ref 12–45)
BILIRUB CONJ SERPL-MCNC: 0.2 MG/DL (ref 0.1–0.5)
BILIRUB INDIRECT SERPL-MCNC: 0.4 MG/DL (ref 0–1)
BILIRUB SERPL-MCNC: 0.6 MG/DL (ref 0.1–1.5)
BUN SERPL-MCNC: 10 MG/DL (ref 8–22)
CALCIUM SERPL-MCNC: 9.3 MG/DL (ref 8.4–10.2)
CHLORIDE SERPL-SCNC: 94 MMOL/L (ref 96–112)
CHOLEST SERPL-MCNC: 289 MG/DL (ref 100–199)
CO2 SERPL-SCNC: 20 MMOL/L (ref 20–33)
CREAT SERPL-MCNC: 0.64 MG/DL (ref 0.5–1.4)
ERYTHROCYTE [DISTWIDTH] IN BLOOD BY AUTOMATED COUNT: 41.6 FL (ref 35.9–50)
GFR SERPLBLD CREATININE-BSD FMLA CKD-EPI: 98 ML/MIN/1.73 M 2
GLUCOSE SERPL-MCNC: 93 MG/DL (ref 65–99)
HCT VFR BLD AUTO: 38.7 % (ref 37–47)
HDLC SERPL-MCNC: 162 MG/DL
HGB BLD-MCNC: 13.8 G/DL (ref 12–16)
INR PPP: 0.99 (ref 0.87–1.13)
LACTATE SERPL-SCNC: 0.8 MMOL/L (ref 0.5–2)
LDLC SERPL CALC-MCNC: 110 MG/DL
MAGNESIUM SERPL-MCNC: 2 MG/DL (ref 1.5–2.5)
MCH RBC QN AUTO: 33.8 PG (ref 27–33)
MCHC RBC AUTO-ENTMCNC: 35.7 G/DL (ref 32.2–35.5)
MCV RBC AUTO: 94.9 FL (ref 81.4–97.8)
PHOSPHATE SERPL-MCNC: 4.2 MG/DL (ref 2.5–4.5)
PLATELET # BLD AUTO: 59 K/UL (ref 164–446)
PLATELETS.RETICULATED NFR BLD AUTO: 5.6 % (ref 0.6–13.1)
PMV BLD AUTO: 10.2 FL (ref 9–12.9)
POTASSIUM SERPL-SCNC: 3.7 MMOL/L (ref 3.6–5.5)
PROT SERPL-MCNC: 7.1 G/DL (ref 6–8.2)
PROTHROMBIN TIME: 13.4 SEC (ref 12–14.6)
RBC # BLD AUTO: 4.08 M/UL (ref 4.2–5.4)
SODIUM SERPL-SCNC: 130 MMOL/L (ref 135–145)
SODIUM SERPL-SCNC: 130 MMOL/L (ref 135–145)
SODIUM SERPL-SCNC: 132 MMOL/L (ref 135–145)
TRIGL SERPL-MCNC: 85 MG/DL (ref 0–149)
TROPONIN T SERPL-MCNC: 25 NG/L (ref 6–19)
TROPONIN T SERPL-MCNC: 35 NG/L (ref 6–19)
TSH SERPL DL<=0.005 MIU/L-ACNC: 4 UIU/ML (ref 0.38–5.33)
WBC # BLD AUTO: 3.2 K/UL (ref 4.8–10.8)

## 2024-12-01 PROCEDURE — A9270 NON-COVERED ITEM OR SERVICE: HCPCS | Performed by: HOSPITALIST

## 2024-12-01 PROCEDURE — 700102 HCHG RX REV CODE 250 W/ 637 OVERRIDE(OP): Performed by: HOSPITALIST

## 2024-12-01 PROCEDURE — 97162 PT EVAL MOD COMPLEX 30 MIN: CPT

## 2024-12-01 PROCEDURE — 84100 ASSAY OF PHOSPHORUS: CPT

## 2024-12-01 PROCEDURE — 700111 HCHG RX REV CODE 636 W/ 250 OVERRIDE (IP): Performed by: INTERNAL MEDICINE

## 2024-12-01 PROCEDURE — 8E0ZXY6 ISOLATION: ICD-10-PCS | Performed by: HOSPITALIST

## 2024-12-01 PROCEDURE — 80061 LIPID PANEL: CPT

## 2024-12-01 PROCEDURE — 97535 SELF CARE MNGMENT TRAINING: CPT

## 2024-12-01 PROCEDURE — 85055 RETICULATED PLATELET ASSAY: CPT

## 2024-12-01 PROCEDURE — 94760 N-INVAS EAR/PLS OXIMETRY 1: CPT

## 2024-12-01 PROCEDURE — 97165 OT EVAL LOW COMPLEX 30 MIN: CPT

## 2024-12-01 PROCEDURE — 700111 HCHG RX REV CODE 636 W/ 250 OVERRIDE (IP): Performed by: HOSPITALIST

## 2024-12-01 PROCEDURE — 85610 PROTHROMBIN TIME: CPT

## 2024-12-01 PROCEDURE — 36415 COLL VENOUS BLD VENIPUNCTURE: CPT

## 2024-12-01 PROCEDURE — 99233 SBSQ HOSP IP/OBS HIGH 50: CPT | Performed by: INTERNAL MEDICINE

## 2024-12-01 PROCEDURE — 85027 COMPLETE CBC AUTOMATED: CPT

## 2024-12-01 PROCEDURE — 770020 HCHG ROOM/CARE - TELE (206)

## 2024-12-01 PROCEDURE — 700102 HCHG RX REV CODE 250 W/ 637 OVERRIDE(OP): Mod: JZ | Performed by: INTERNAL MEDICINE

## 2024-12-01 PROCEDURE — A9270 NON-COVERED ITEM OR SERVICE: HCPCS | Mod: JZ | Performed by: INTERNAL MEDICINE

## 2024-12-01 PROCEDURE — 84484 ASSAY OF TROPONIN QUANT: CPT

## 2024-12-01 PROCEDURE — 84443 ASSAY THYROID STIM HORMONE: CPT

## 2024-12-01 PROCEDURE — 80048 BASIC METABOLIC PNL TOTAL CA: CPT

## 2024-12-01 PROCEDURE — 80076 HEPATIC FUNCTION PANEL: CPT

## 2024-12-01 PROCEDURE — 700105 HCHG RX REV CODE 258: Performed by: HOSPITALIST

## 2024-12-01 PROCEDURE — 83735 ASSAY OF MAGNESIUM: CPT

## 2024-12-01 PROCEDURE — 76700 US EXAM ABDOM COMPLETE: CPT

## 2024-12-01 PROCEDURE — 82140 ASSAY OF AMMONIA: CPT

## 2024-12-01 PROCEDURE — 84295 ASSAY OF SERUM SODIUM: CPT

## 2024-12-01 PROCEDURE — 83605 ASSAY OF LACTIC ACID: CPT

## 2024-12-01 RX ORDER — LORAZEPAM 1 MG/1
1 TABLET ORAL EVERY 4 HOURS PRN
Status: DISCONTINUED | OUTPATIENT
Start: 2024-12-01 | End: 2024-12-02 | Stop reason: HOSPADM

## 2024-12-01 RX ORDER — LORAZEPAM 2 MG/ML
2 INJECTION INTRAMUSCULAR
Status: DISCONTINUED | OUTPATIENT
Start: 2024-12-01 | End: 2024-12-02 | Stop reason: HOSPADM

## 2024-12-01 RX ORDER — POTASSIUM CHLORIDE 1500 MG/1
40 TABLET, EXTENDED RELEASE ORAL ONCE
Status: COMPLETED | OUTPATIENT
Start: 2024-12-01 | End: 2024-12-01

## 2024-12-01 RX ORDER — LORAZEPAM 1 MG/1
2 TABLET ORAL
Status: DISCONTINUED | OUTPATIENT
Start: 2024-12-01 | End: 2024-12-02 | Stop reason: HOSPADM

## 2024-12-01 RX ORDER — NYSTATIN 100000 [USP'U]/G
POWDER TOPICAL 2 TIMES DAILY
Status: DISCONTINUED | OUTPATIENT
Start: 2024-12-01 | End: 2024-12-02 | Stop reason: HOSPADM

## 2024-12-01 RX ORDER — GAUZE BANDAGE 2" X 2"
100 BANDAGE TOPICAL DAILY
Status: DISCONTINUED | OUTPATIENT
Start: 2024-12-05 | End: 2024-12-01

## 2024-12-01 RX ORDER — LORAZEPAM 2 MG/ML
0.5 INJECTION INTRAMUSCULAR EVERY 4 HOURS PRN
Status: DISCONTINUED | OUTPATIENT
Start: 2024-12-01 | End: 2024-12-02 | Stop reason: HOSPADM

## 2024-12-01 RX ORDER — LORAZEPAM 2 MG/ML
1.5 INJECTION INTRAMUSCULAR
Status: DISCONTINUED | OUTPATIENT
Start: 2024-12-01 | End: 2024-12-02 | Stop reason: HOSPADM

## 2024-12-01 RX ORDER — THIAMINE HYDROCHLORIDE 100 MG/ML
100 INJECTION, SOLUTION INTRAMUSCULAR; INTRAVENOUS DAILY
Status: DISCONTINUED | OUTPATIENT
Start: 2024-12-01 | End: 2024-12-02 | Stop reason: HOSPADM

## 2024-12-01 RX ORDER — LORAZEPAM 1 MG/1
4 TABLET ORAL
Status: DISCONTINUED | OUTPATIENT
Start: 2024-12-01 | End: 2024-12-02 | Stop reason: HOSPADM

## 2024-12-01 RX ORDER — LORAZEPAM 1 MG/1
3 TABLET ORAL
Status: DISCONTINUED | OUTPATIENT
Start: 2024-12-01 | End: 2024-12-02 | Stop reason: HOSPADM

## 2024-12-01 RX ORDER — FOLIC ACID 1 MG/1
1 TABLET ORAL DAILY
Status: DISCONTINUED | OUTPATIENT
Start: 2024-12-02 | End: 2024-12-01

## 2024-12-01 RX ORDER — FOLIC ACID 1 MG/1
1 TABLET ORAL DAILY
Status: DISCONTINUED | OUTPATIENT
Start: 2024-12-02 | End: 2024-12-02 | Stop reason: HOSPADM

## 2024-12-01 RX ORDER — LORAZEPAM 0.5 MG/1
0.5 TABLET ORAL EVERY 4 HOURS PRN
Status: DISCONTINUED | OUTPATIENT
Start: 2024-12-01 | End: 2024-12-02 | Stop reason: HOSPADM

## 2024-12-01 RX ORDER — LORAZEPAM 2 MG/ML
1 INJECTION INTRAMUSCULAR
Status: DISCONTINUED | OUTPATIENT
Start: 2024-12-01 | End: 2024-12-02 | Stop reason: HOSPADM

## 2024-12-01 RX ORDER — THIAMINE HYDROCHLORIDE 100 MG/ML
100 INJECTION, SOLUTION INTRAMUSCULAR; INTRAVENOUS DAILY
Status: DISCONTINUED | OUTPATIENT
Start: 2024-12-01 | End: 2024-12-01

## 2024-12-01 RX ORDER — GAUZE BANDAGE 2" X 2"
100 BANDAGE TOPICAL DAILY
Status: DISCONTINUED | OUTPATIENT
Start: 2024-12-04 | End: 2024-12-02 | Stop reason: HOSPADM

## 2024-12-01 RX ADMIN — THERA TABS 1 TABLET: TAB at 06:35

## 2024-12-01 RX ADMIN — Medication 100 MG: at 06:35

## 2024-12-01 RX ADMIN — CHLORDIAZEPOXIDE HYDROCHLORIDE 25 MG: 25 CAPSULE ORAL at 23:26

## 2024-12-01 RX ADMIN — ACETAMINOPHEN 650 MG: 325 TABLET ORAL at 09:12

## 2024-12-01 RX ADMIN — Medication 400 MG: at 06:35

## 2024-12-01 RX ADMIN — POTASSIUM CHLORIDE 40 MEQ: 1500 TABLET, EXTENDED RELEASE ORAL at 12:37

## 2024-12-01 RX ADMIN — CHLORDIAZEPOXIDE HYDROCHLORIDE 25 MG: 25 CAPSULE ORAL at 17:18

## 2024-12-01 RX ADMIN — NYSTATIN: 100000 POWDER TOPICAL at 12:38

## 2024-12-01 RX ADMIN — FOLIC ACID 1 MG: 1 TABLET ORAL at 06:35

## 2024-12-01 RX ADMIN — NYSTATIN: 100000 POWDER TOPICAL at 17:19

## 2024-12-01 RX ADMIN — AMLODIPINE BESYLATE 5 MG: 5 TABLET ORAL at 06:35

## 2024-12-01 RX ADMIN — THIAMINE HYDROCHLORIDE 100 MG: 100 INJECTION, SOLUTION INTRAMUSCULAR; INTRAVENOUS at 13:29

## 2024-12-01 RX ADMIN — Medication 400 MG: at 17:17

## 2024-12-01 RX ADMIN — OXYCODONE 5 MG: 5 TABLET ORAL at 03:31

## 2024-12-01 RX ADMIN — HEPARIN SODIUM 5000 UNITS: 5000 INJECTION, SOLUTION INTRAVENOUS; SUBCUTANEOUS at 06:35

## 2024-12-01 RX ADMIN — HEPARIN SODIUM 5000 UNITS: 5000 INJECTION, SOLUTION INTRAVENOUS; SUBCUTANEOUS at 13:29

## 2024-12-01 RX ADMIN — CHLORDIAZEPOXIDE HYDROCHLORIDE 50 MG: 25 CAPSULE ORAL at 06:32

## 2024-12-01 RX ADMIN — TELMISARTAN 80 MG: 40 TABLET ORAL at 06:32

## 2024-12-01 RX ADMIN — CHLORDIAZEPOXIDE HYDROCHLORIDE 50 MG: 25 CAPSULE ORAL at 12:37

## 2024-12-01 RX ADMIN — CLONIDINE HYDROCHLORIDE 0.1 MG: 0.1 TABLET ORAL at 23:26

## 2024-12-01 RX ADMIN — SODIUM CHLORIDE: 9 INJECTION, SOLUTION INTRAVENOUS at 06:42

## 2024-12-01 ASSESSMENT — COGNITIVE AND FUNCTIONAL STATUS - GENERAL
SUGGESTED CMS G CODE MODIFIER DAILY ACTIVITY: CJ
DAILY ACTIVITIY SCORE: 21
TOILETING: A LITTLE
WALKING IN HOSPITAL ROOM: A LITTLE
SUGGESTED CMS G CODE MODIFIER MOBILITY: CJ
HELP NEEDED FOR BATHING: A LITTLE
CLIMB 3 TO 5 STEPS WITH RAILING: A LITTLE
MOBILITY SCORE: 22
DRESSING REGULAR LOWER BODY CLOTHING: A LITTLE

## 2024-12-01 ASSESSMENT — LIFESTYLE VARIABLES
SUBSTANCE_ABUSE: 1
TOTAL SCORE: 2
AGITATION: NORMAL ACTIVITY
NAUSEA AND VOMITING: NO NAUSEA AND NO VOMITING
PAROXYSMAL SWEATS: BARELY PERCEPTIBLE SWEATING. PALMS MOIST
TREMOR: TREMOR NOT VISIBLE BUT CAN BE FELT, FINGERTIP TO FINGERTIP
AUDITORY DISTURBANCES: NOT PRESENT
PAROXYSMAL SWEATS: BARELY PERCEPTIBLE SWEATING. PALMS MOIST
PAROXYSMAL SWEATS: NO SWEAT VISIBLE
HEADACHE, FULLNESS IN HEAD: NOT PRESENT
NAUSEA AND VOMITING: NO NAUSEA AND NO VOMITING
TOTAL SCORE: 3
ORIENTATION AND CLOUDING OF SENSORIUM: ORIENTED AND CAN DO SERIAL ADDITIONS
TREMOR: *
ANXIETY: MILDLY ANXIOUS
AGITATION: NORMAL ACTIVITY
TREMOR: TREMOR NOT VISIBLE BUT CAN BE FELT, FINGERTIP TO FINGERTIP
ORIENTATION AND CLOUDING OF SENSORIUM: ORIENTED AND CAN DO SERIAL ADDITIONS
HEADACHE, FULLNESS IN HEAD: NOT PRESENT
ANXIETY: MILDLY ANXIOUS
HEADACHE, FULLNESS IN HEAD: NOT PRESENT
TOTAL SCORE: 1
ANXIETY: NO ANXIETY (AT EASE)
TOTAL SCORE: 3
HEADACHE, FULLNESS IN HEAD: NOT PRESENT
AGITATION: NORMAL ACTIVITY
HEADACHE, FULLNESS IN HEAD: NOT PRESENT
ANXIETY: NO ANXIETY (AT EASE)
AGITATION: NORMAL ACTIVITY
TREMOR: TREMOR NOT VISIBLE BUT CAN BE FELT, FINGERTIP TO FINGERTIP
ANXIETY: *
HEADACHE, FULLNESS IN HEAD: NOT PRESENT
AUDITORY DISTURBANCES: NOT PRESENT
VISUAL DISTURBANCES: NOT PRESENT
NAUSEA AND VOMITING: NO NAUSEA AND NO VOMITING
VISUAL DISTURBANCES: NOT PRESENT
VISUAL DISTURBANCES: NOT PRESENT
AUDITORY DISTURBANCES: NOT PRESENT
AUDITORY DISTURBANCES: NOT PRESENT
ORIENTATION AND CLOUDING OF SENSORIUM: ORIENTED AND CAN DO SERIAL ADDITIONS
ORIENTATION AND CLOUDING OF SENSORIUM: ORIENTED AND CAN DO SERIAL ADDITIONS
PAROXYSMAL SWEATS: BARELY PERCEPTIBLE SWEATING. PALMS MOIST
AUDITORY DISTURBANCES: NOT PRESENT
TOTAL SCORE: 4
ORIENTATION AND CLOUDING OF SENSORIUM: ORIENTED AND CAN DO SERIAL ADDITIONS
VISUAL DISTURBANCES: NOT PRESENT
ANXIETY: MILDLY ANXIOUS
VISUAL DISTURBANCES: NOT PRESENT
NAUSEA AND VOMITING: NO NAUSEA AND NO VOMITING
AGITATION: NORMAL ACTIVITY
VISUAL DISTURBANCES: NOT PRESENT
PAROXYSMAL SWEATS: NO SWEAT VISIBLE
TREMOR: TREMOR NOT VISIBLE BUT CAN BE FELT, FINGERTIP TO FINGERTIP
TOTAL SCORE: 3
PAROXYSMAL SWEATS: BARELY PERCEPTIBLE SWEATING. PALMS MOIST
NAUSEA AND VOMITING: NO NAUSEA AND NO VOMITING
ORIENTATION AND CLOUDING OF SENSORIUM: ORIENTED AND CAN DO SERIAL ADDITIONS
NAUSEA AND VOMITING: NO NAUSEA AND NO VOMITING
TREMOR: TREMOR NOT VISIBLE BUT CAN BE FELT, FINGERTIP TO FINGERTIP
AGITATION: NORMAL ACTIVITY
AUDITORY DISTURBANCES: NOT PRESENT

## 2024-12-01 ASSESSMENT — ACTIVITIES OF DAILY LIVING (ADL): TOILETING: INDEPENDENT

## 2024-12-01 ASSESSMENT — ENCOUNTER SYMPTOMS
HEADACHES: 0
DIZZINESS: 0
ABDOMINAL PAIN: 0
NERVOUS/ANXIOUS: 0
FEVER: 0
VOMITING: 0
NAUSEA: 0
SHORTNESS OF BREATH: 0
CHILLS: 0

## 2024-12-01 ASSESSMENT — PAIN DESCRIPTION - PAIN TYPE
TYPE: ACUTE PAIN

## 2024-12-01 ASSESSMENT — PATIENT HEALTH QUESTIONNAIRE - PHQ9
SUM OF ALL RESPONSES TO PHQ9 QUESTIONS 1 AND 2: 0
2. FEELING DOWN, DEPRESSED, IRRITABLE, OR HOPELESS: NOT AT ALL
1. LITTLE INTEREST OR PLEASURE IN DOING THINGS: NOT AT ALL

## 2024-12-01 ASSESSMENT — GAIT ASSESSMENTS
GAIT LEVEL OF ASSIST: STANDBY ASSIST
ASSISTIVE DEVICE: FRONT WHEEL WALKER
DISTANCE (FEET): 20

## 2024-12-01 NOTE — CARE PLAN
The patient is Stable - Low risk of patient condition declining or worsening    Shift Goals  Clinical Goals: CIWA, Q4h Neuro, IV fluids, monitor labs, monitor vitals  Patient Goals: sleep  Family Goals: updates    Progress made toward(s) clinical / shift goals:        Problem: Pain - Standard  Goal: Alleviation of pain or a reduction in pain to the patient’s comfort goal  Outcome: Progressing  Note: Pt medicated for pain per MAR.     Problem: Optimal Care for Alcohol Withdrawal  Goal: Optimal Care for the alcohol withdrawal patient  Outcome: Progressing     Problem: Seizure Precautions  Goal: Implementation of seizure precautions  Outcome: Progressing     Problem: Risk for Aspiration  Goal: Patient's risk for aspiration will be absent or decrease  Outcome: Progressing     Problem: Skin Integrity  Goal: Skin integrity is maintained or improved  Outcome: Progressing     Problem: Fall Risk  Goal: Patient will remain free from falls  Outcome: Progressing  Note: Appropriate fall precautions in place. Pt educated to use call light when in need for ambulation. Call light within reach.       Patient is not progressing towards the following goals:

## 2024-12-01 NOTE — PROGRESS NOTES
Hospital Medicine Daily Progress Note    Date of Service  12/1/2024    Chief Complaint  Mary Young is a 64 y.o. female admitted 11/30/2024 with alcohol withdrawal seizure and COVID 19    Hospital Course  64 year old female with history of heavy alcohol use for many years was unable to drink alcohol while with family over the holidays.  She subsequently had alcohol withdrawal seizure and an unwitnessed fall episode, subsequently found to have positive COVID-19.  She was also noted to be hyponatremic    Interval Problem Update  Started on Librium overnight, monitored on CIWA protocol.  Sodium most recently 13 she denies significant anxiety, although she is completely oriented-Her mentation does seem slightly off, CT scan showed widening of the sulci and enlarged ventricles consistent with cerebral atrophy.    The patient admitted to the nurses that she is drinking 4 glasses of vodka every day, she states she was drinking because she thought it would be safe because it is legal-she has tried to abstain in the past and has managed to go 1 to 1.5 months without alcohol but always returns to drinking.  She has tried to stop when her  has told her she needs to stop. She has no prior history of hospitalizations for alcohol withdrawal, denies prior alcohol withdrawal seizures, hallucinations.  Typically she states she just gets very shaky.  She has never been to rehab, AA or any type of counseling.     Daughter updated on POC.    I have discussed this patient's plan of care and discharge plan at IDT rounds today with Case Management, Nursing, Nursing leadership, and other members of the IDT team.    Consultants/Specialty  none patient    Code Status  Full Code    Disposition  The patient is not medically cleared for discharge to home or a post-acute facility.  Anticipate discharge to: home with organized home healthcare and close outpatient follow-up    I have placed the appropriate orders for post-discharge  "needs.    Review of Systems  Review of Systems   Constitutional:  Negative for chills and fever.   Respiratory:  Negative for shortness of breath.    Cardiovascular:  Negative for chest pain.   Gastrointestinal:  Negative for abdominal pain, nausea and vomiting.   Musculoskeletal:  Positive for joint pain.   Neurological:  Negative for dizziness and headaches.   Psychiatric/Behavioral:  Positive for substance abuse. The patient is not nervous/anxious.         Physical Exam  Temp:  [36.9 °C (98.4 °F)-37.3 °C (99.1 °F)] 37.1 °C (98.7 °F)  Pulse:  [76-94] 92  Resp:  [14-20] 18  BP: (111-146)/(57-94) 133/83  SpO2:  [92 %-96 %] 94 %    Physical Exam  Vitals and nursing note reviewed.   Constitutional:       General: She is not in acute distress.     Appearance: She is obese. She is not ill-appearing, toxic-appearing or diaphoretic.   HENT:      Head: Normocephalic and atraumatic.      Nose: Nose normal.      Mouth/Throat:      Mouth: Mucous membranes are moist.   Eyes:      Extraocular Movements: Extraocular movements intact.      Pupils: Pupils are equal, round, and reactive to light.   Cardiovascular:      Rate and Rhythm: Normal rate and regular rhythm.   Pulmonary:      Effort: Pulmonary effort is normal. No respiratory distress.      Breath sounds: Normal breath sounds. No wheezing.   Abdominal:      General: Bowel sounds are normal. There is no distension.      Palpations: Abdomen is soft.      Tenderness: There is no abdominal tenderness.   Musculoskeletal:      Right lower leg: No edema.      Left lower leg: No edema.   Skin:     General: Skin is warm and dry.      Comments: Intertrigo under breasts and Lgroin   Neurological:      General: No focal deficit present.      Mental Status: She is alert and oriented to person, place, and time.      Cranial Nerves: No cranial nerve deficit.      Comments: Mentation very slightly \"off\" grossly oriented  Mild tremor   Psychiatric:         Mood and Affect: Mood normal.    "      Behavior: Behavior normal.         Fluids  No intake or output data in the 24 hours ending 12/01/24 1210     Laboratory  Recent Labs     11/30/24  1132 12/01/24  0200   WBC 3.5* 3.2*   RBC 4.61 4.08*   HEMOGLOBIN 15.3 13.8   HEMATOCRIT 43.7 38.7   MCV 94.8 94.9   MCH 33.2* 33.8*   MCHC 35.0 35.7*   RDW 41.1 41.6   PLATELETCT 73* 59*   MPV 10.0 10.2     Recent Labs     11/30/24  1132 11/30/24  1847 12/01/24  0200 12/01/24  0814   SODIUM 129* 124* 130* 130*   POTASSIUM 4.1  --  3.7  --    CHLORIDE 88*  --  94*  --    CO2 21  --  20  --    GLUCOSE 135*  --  93  --    BUN 11  --  10  --    CREATININE 0.82  --  0.64  --    CALCIUM 9.9  --  9.3  --              Recent Labs     12/01/24  0200   TRIGLYCERIDE 85      *       Imaging  CT-HEAD W/O   Final Result         NO ACUTE ABNORMALITIES ARE NOTED ON CT SCAN OF THE HEAD.      Findings are consistent with atrophy.  Decreased attenuation in the periventricular white matter likely indicates microvascular ischemic disease.               DX-ANKLE 3+ VIEWS RIGHT   Final Result         Small mildly displaced fracture fragments in the dorsal aspect of the talus and anterior distal tibia could be acute. Correlate with point tenderness.      Small tibiotalar joint effusion.      US-ABDOMEN LTD (SOFT TISSUE)    (Results Pending)        Assessment/Plan  * Alcohol withdrawal seizure with complication (HCC)- (present on admission)  Assessment & Plan  No prior h/o Seizures or DT's  On Librium taper w/ prn lorazepam  On Thiamine    If LFT's improved will initiate Naltrexone at DC    Meets at least 8/11 criteria  = severe alcohol use disorder        Alcohol liver damage (HCC)- (present on admission)  Assessment & Plan  Check INR, Ammonia, Sono    Cerebral atrophy (HCC)- (present on admission)  Assessment & Plan  ? Causing mild altered mentation vs post ictal vs Thiamine depletion    Thrombocytopenia (HCC)- (present on admission)  Assessment & Plan  2/2 Alcohol  use    Intertriginous candidiasis- (present on admission)  Assessment & Plan  Topical nystatin    Hypokalemia- (present on admission)  Assessment & Plan  Replace and monitor    Right ankle pain- (present on admission)  Assessment & Plan  In the process of the fall the patient also developed right ankle pain  Imaging was done  Small mildly displaced fracture fragments in the dorsal aspect of the talus and anterior distal tibia could be acute  PT recommend stairs training, walker, HH?      Hyperglycemia- (present on admission)  Assessment & Plan  A1c borderline 5.4    Elevated liver enzymes- (present on admission)  Assessment & Plan  Check US    Hyponatremia- (present on admission)  Assessment & Plan  Secondary to alcohol abuse patient has SIADH with hyponatremia down to 127  Concern for SIADH vs liver dz-stop IV fluids, check Q8        Leukopenia- (present on admission)  Assessment & Plan  Mild leukopenia 3.5  ? Related to Alcohol effects on Bone marrow, vs viral syndrome 2/2 covid    Subclinical hypothyroidism- (present on admission)  Assessment & Plan  TSH pending    Hyperlipidemia  Assessment & Plan  Check a fasting lipid panel  Currently not on statin.    Obesity (BMI 30-39.9)  Assessment & Plan  Outpatient weight loss management program lifestyle modification highly recommended  Her thyroid functions are abnormal and this could be contributing to her obesity.  Body mass index is 33.29 kg/m².      Essential hypertension- (present on admission)  Assessment & Plan  Continue home meds        VTE prophylaxis:   SCDs/TEDs      I have performed a physical exam and reviewed and updated ROS and Plan today (12/1/2024). In review of yesterday's note (11/30/2024), there are no changes except as documented above.

## 2024-12-01 NOTE — THERAPY
Physical Therapy   Initial Evaluation     Patient Name: Mary Young  Age:  64 y.o., Sex:  female  Medical Record #: [id number]  Today's Date: 12/1/2024          Assessment  Pt is a 63 yo F who presented on 11/30/2024 with unresponsiveness. Pt's  found her on the ground in the kitchen. Pt has PMH of alcohol abuse and was admitted for admitted for alcohol withdrawal management, correction of the hyponatremia, monitoring of her seizures, stabilization of her blood pressure. Pt demonstrated bed mobility with SPV and ambulated in room with SBA and FWW. Pt reported her ankle was feeling okay since she had taken Tylenol. Pt would benefit from one additional treatment session to assess performance of stairs prior to DC as she has multiple stairs in home and her PLOF is IND with all ADLs. Anticipate that the patient will have no further physical therapy needs after discharge from the hospital. Recommend FWW for safety and unloading of injured ankle if needed.     Plan    Physical Therapy Initial Treatment Plan   Treatment Plan : Stair Training  Treatment Frequency: 3 Times per Week (1 more visit to assess stair negotiation)  Duration: Until Therapy Goals Met    DC Equipment Recommendations: Front-Wheel Walker  Discharge Recommendations: Anticipate that the patient will have no further physical therapy needs after discharge from the hospital        Objective       12/01/24 1022   Initial Contact Note    Initial Contact Note Order Received and Verified, Physical Therapy Evaluation in Progress with Full Report to Follow.   Pain 0 - 10 Group   Location Ankle   Location Orientation Right   Prior Living Situation   Housing / Facility 2 Story House   Steps In Home 12   Rail Both Rail (Steps in Home)   Equipment Owned None   Lives with - Patient's Self Care Capacity Spouse   Comments Pt lives with susana in 2 story home in [facility]. Reports that she still drives and is typically IND with ADLs and mobility  at home   Prior Level of Functional Mobility   Bed Mobility Independent   Transfer Status Independent   Ambulation Independent   Ambulation Distance community   Assistive Devices Used None   Stairs Independent   Cognition    Level of Consciousness Alert   Active ROM Lower Body    Active ROM Lower Body  WDL   Strength Lower Body   Lower Body Strength  WDL   Sensation Lower Body   Lower Extremity Sensation   WDL   Balance Assessment   Sitting Balance (Static) Fair +   Sitting Balance (Dynamic) Fair +   Standing Balance (Static) Fair   Standing Balance (Dynamic) Fair   Weight Shift Sitting Good   Weight Shift Standing Good   Bed Mobility    Supine to Sit Supervised   Scooting Supervised   Rolling Supervised   Gait Analysis   Gait Level Of Assist Standby Assist   Assistive Device Front Wheel Walker   Distance (Feet) 20  (+ 5 ft x 2)   # of Times Distance was Traveled 4   Functional Mobility   Sit to Stand Supervised   Bed, Chair, Wheelchair Transfer Supervised   Transfer Method Stand Step   Mobility bed mobility, sit to stand, ambulate in room, transfer to chair   6 Clicks Assessment - How much HELP from from another person do you currently need... (If the patient hasn't done an activity recently, how much help from another person do you think he/she would need if he/she tried?)   Turning from your back to your side while in a flat bed without using bedrails? 4   Moving from lying on your back to sitting on the side of a flat bed without using bedrails? 4   Moving to and from a bed to a chair (including a wheelchair)? 4   Standing up from a chair using your arms (e.g., wheelchair, or bedside chair)? 4   Walking in hospital room? 3   Climbing 3-5 steps with a railing? 3   6 clicks Mobility Score 22   Activity Tolerance   Sitting in Chair 3 min   Sitting Edge of Bed 5 min   Standing 10 min   Short Term Goals    Short Term Goal # 1 pt will demonstrate stair negotiation with SPV to indicate ability to perform stairs in her  home within 3 visits   Education Group   Education Provided Role of Physical Therapist;Gait Training   Role of Physical Therapist Patient Response Patient;Acceptance;Explanation;Verbal Demonstration   Gait Training Patient Response Patient;Acceptance;Explanation;Action Demonstration   Additional Comments Educated on WBAT with FWW and increased use of BUE if significant increase in ankle pain   Physical Therapy Initial Treatment Plan    Treatment Plan  Stair Training   Treatment Frequency 3 Times per Week  (1 more visit to assess stair negotiation)   Duration Until Therapy Goals Met   Anticipated Discharge Equipment and Recommendations   DC Equipment Recommendations Front-Wheel Walker   Discharge Recommendations Anticipate that the patient will have no further physical therapy needs after discharge from the hospital   Interdisciplinary Plan of Care Collaboration   IDT Collaboration with  Nursing   Patient Position at End of Therapy Seated;Chair Alarm On;Call Light within Reach;Tray Table within Reach;Phone within Reach   Collaboration Comments RN made aware of pt position at end of session and PT recs   Session Information   Date / Session Number  12/01, 1(1/3, 12/07)

## 2024-12-01 NOTE — PROGRESS NOTES
Patient had a syncopal event as well as possible seizure.  The patient's sodium is low as is her absolute lymphocyte count so COVID-19 test was done and this is positive for COVID-19 infection.  Currently is not hypoxic therefore no Decadron will be started.  Isolation precautions will be in place

## 2024-12-01 NOTE — PROGRESS NOTES
Telemetry Shift Summary     Rhythm: SR/ST  Rate:   Measurements: 0.14/0.06/0.40  Ectopy (reported by Monitor Tech): rPVC     Normal Values  Rhythm: Sinus  HR:   Measurements: 0.12-0.20/0.06-0.10/0.30-0.52

## 2024-12-01 NOTE — PROGRESS NOTES
"Daughter called patient and requested to speak with RN regarding POC. RN provided updates on POC with patients consent. Daughter requesting clarification on why patient was being held in hospital if lab results are trending in the right direction. Daughter wishing to take patient home AMA. RN explained to daughter that is not possible due to patient being A&O4 and able to make own medical decisions at this time. RN asked patient if she was willing to stay for continued workup and patient agreed stating \"I really need to figure out what is going on before I go home\". Daughter requested call from MD to discuss concerns and the need to take patient home AMA. MD made aware of above situation and has agreed to give daughter a phone call.   "

## 2024-12-02 VITALS
SYSTOLIC BLOOD PRESSURE: 116 MMHG | DIASTOLIC BLOOD PRESSURE: 69 MMHG | BODY MASS INDEX: 33.49 KG/M2 | TEMPERATURE: 98.1 F | OXYGEN SATURATION: 97 % | HEART RATE: 80 BPM | HEIGHT: 62 IN | WEIGHT: 182 LBS | RESPIRATION RATE: 18 BRPM

## 2024-12-02 LAB
ALBUMIN SERPL BCP-MCNC: 3.6 G/DL (ref 3.2–4.9)
ALBUMIN/GLOB SERPL: 1.1 G/DL
ALP SERPL-CCNC: 71 U/L (ref 30–99)
ALT SERPL-CCNC: 85 U/L (ref 2–50)
ANION GAP SERPL CALC-SCNC: 15 MMOL/L (ref 7–16)
AST SERPL-CCNC: 69 U/L (ref 12–45)
BILIRUB SERPL-MCNC: 0.5 MG/DL (ref 0.1–1.5)
BUN SERPL-MCNC: 12 MG/DL (ref 8–22)
CALCIUM ALBUM COR SERPL-MCNC: 9.4 MG/DL (ref 8.5–10.5)
CALCIUM SERPL-MCNC: 9.1 MG/DL (ref 8.4–10.2)
CHLORIDE SERPL-SCNC: 98 MMOL/L (ref 96–112)
CO2 SERPL-SCNC: 19 MMOL/L (ref 20–33)
CREAT SERPL-MCNC: 0.7 MG/DL (ref 0.5–1.4)
GFR SERPLBLD CREATININE-BSD FMLA CKD-EPI: 96 ML/MIN/1.73 M 2
GLOBULIN SER CALC-MCNC: 3.2 G/DL (ref 1.9–3.5)
GLUCOSE SERPL-MCNC: 94 MG/DL (ref 65–99)
MAGNESIUM SERPL-MCNC: 1.8 MG/DL (ref 1.5–2.5)
POTASSIUM SERPL-SCNC: 3.9 MMOL/L (ref 3.6–5.5)
PROT SERPL-MCNC: 6.8 G/DL (ref 6–8.2)
SODIUM SERPL-SCNC: 132 MMOL/L (ref 135–145)

## 2024-12-02 PROCEDURE — 36415 COLL VENOUS BLD VENIPUNCTURE: CPT

## 2024-12-02 PROCEDURE — A9270 NON-COVERED ITEM OR SERVICE: HCPCS | Performed by: INTERNAL MEDICINE

## 2024-12-02 PROCEDURE — A9270 NON-COVERED ITEM OR SERVICE: HCPCS | Performed by: HOSPITALIST

## 2024-12-02 PROCEDURE — 80053 COMPREHEN METABOLIC PANEL: CPT

## 2024-12-02 PROCEDURE — 700111 HCHG RX REV CODE 636 W/ 250 OVERRIDE (IP): Performed by: INTERNAL MEDICINE

## 2024-12-02 PROCEDURE — 700102 HCHG RX REV CODE 250 W/ 637 OVERRIDE(OP): Performed by: INTERNAL MEDICINE

## 2024-12-02 PROCEDURE — 700102 HCHG RX REV CODE 250 W/ 637 OVERRIDE(OP): Performed by: HOSPITALIST

## 2024-12-02 PROCEDURE — 83735 ASSAY OF MAGNESIUM: CPT

## 2024-12-02 RX ADMIN — FOLIC ACID 1 MG: 1 TABLET ORAL at 05:35

## 2024-12-02 RX ADMIN — TELMISARTAN 80 MG: 40 TABLET ORAL at 05:35

## 2024-12-02 RX ADMIN — THIAMINE HYDROCHLORIDE 100 MG: 100 INJECTION, SOLUTION INTRAMUSCULAR; INTRAVENOUS at 05:37

## 2024-12-02 RX ADMIN — Medication 400 MG: at 05:35

## 2024-12-02 RX ADMIN — THERA TABS 1 TABLET: TAB at 05:35

## 2024-12-02 RX ADMIN — AMLODIPINE BESYLATE 5 MG: 5 TABLET ORAL at 05:35

## 2024-12-02 RX ADMIN — NYSTATIN: 100000 POWDER TOPICAL at 05:39

## 2024-12-02 RX ADMIN — CHLORDIAZEPOXIDE HYDROCHLORIDE 25 MG: 25 CAPSULE ORAL at 05:35

## 2024-12-02 ASSESSMENT — LIFESTYLE VARIABLES
PAROXYSMAL SWEATS: NO SWEAT VISIBLE
ORIENTATION AND CLOUDING OF SENSORIUM: ORIENTED AND CAN DO SERIAL ADDITIONS
AUDITORY DISTURBANCES: NOT PRESENT
TOTAL SCORE: 2
ANXIETY: NO ANXIETY (AT EASE)
HEADACHE, FULLNESS IN HEAD: NOT PRESENT
TREMOR: *
NAUSEA AND VOMITING: NO NAUSEA AND NO VOMITING
VISUAL DISTURBANCES: NOT PRESENT
AGITATION: NORMAL ACTIVITY

## 2024-12-02 NOTE — PROGRESS NOTES
Telemetry Shift Summary     Rhythm: SR/ST  Rate:   Measurements: 0.16/0.08/0.36  Ectopy (reported by Monitor Tech): rPVC

## 2024-12-02 NOTE — THERAPY
Occupational Therapy   Initial Evaluation     Patient Name: Mary Young  Age:  64 y.o., Sex:  female  Medical Record #: [id number]  Today's Date: 12/1/2024     Precautions: Fall Risk. Covid    Assessment  Patient is 64 y.o. female with a diagnosis of alcohol withdrawal seizure with complication. Found down at home by spouse. Pt with R ankle pain - imaging shows mildly displaced fracture fragments in dorsal aspect of talus and anterior distal tibia. Additional factors influencing patient status / progress: hx of heavy etoh use. Pt is A&Ox3, cues needed for safety. Limited by weakness, R ankle pain, decreased safety judgment, decreased balance/functional mobility at this time. Usually indep with I/ADL's, drives. Works from home;  is retired. Lives in 2-story home; states she is able to stay on 1st floor. Pt does not appear to be at her baseline. OT will follow while in house.       Plan  Occupational Therapy Initial Treatment Plan   Treatment Interventions: Self Care / Activities of Daily Living, Neuro Re-Education / Balance, Therapeutic Activity  Treatment Frequency: 3 Times per Week  Duration: Until Therapy Goals Met    DC Equipment Recommendations: Unable to determine at this time  Discharge Recommendations: Recommend home health for continued occupational therapy services     Subjective  Agreeable     Objective     12/01/24 1534   Prior Living Situation   Prior Services None   Housing / Facility 2 Story House   Steps Into Home 1   Steps In Home 12   Bathroom Set up Walk In Shower;Grab Bars;Shower Chair   Equipment Owned Tub / Shower Seat;Crutches;Grab Bar(s) In Tub / Shower   Lives with - Patient's Self Care Capacity Spouse   Comments PT works from home. Spouse is retired.   Prior Level of ADL Function   Self Feeding Independent   Grooming / Hygiene Independent   Bathing Independent   Dressing Independent   Toileting Independent   Prior Level of IADL Function   Medication Management Independent    Laundry Independent   Kitchen Mobility Independent   Finances Unable To Determine At This Time   Home Management Independent   Shopping Independent   Prior Level Of Mobility Independent Without Device in Home   Driving / Transportation Driving Independent   Occupation (Pre-Hospital Vocational) Employed Full Time;Requires Sitting, Desk, Computer Tasks   Leisure Interests Unable To Determine At This Time   History of Falls   History of Falls Yes   Precautions   Precautions Fall Risk   Vitals   O2 Delivery Device None - Room Air   Pain 0 - 10 Group   Location Ankle   Location Orientation Right   Therapist Pain Assessment Post Activity Pain Same as Prior to Activity;Nurse Notified   Cognition    Level of Consciousness Alert   Comments Ox3. Impaired safety awareness.   Passive ROM Upper Body   Passive ROM Upper Body WDL   Active ROM Upper Body   Active ROM Upper Body  WDL   Dominant Hand Right   Strength Upper Body   Upper Body Strength  WDL   Sensation Upper Body   Upper Extremity Sensation  WDL   Upper Body Muscle Tone   Upper Body Muscle Tone  WDL   Coordination Upper Body   Coordination WDL   Balance Assessment   Sitting Balance (Static) Good   Sitting Balance (Dynamic) Fair +   Standing Balance (Static) Fair   Standing Balance (Dynamic) Fair   Weight Shift Sitting Good   Weight Shift Standing Fair   Bed Mobility    Supine to Sit Supervised   Sit to Supine Supervised   ADL Assessment   Grooming Contact Guard Assist   Upper Body Dressing Independent   Lower Body Dressing Minimal Assist   Toileting Standby Assist   Comments extra time   How much help from another person does the patient currently need...   Putting on and taking off regular lower body clothing? 3   Bathing (including washing, rinsing, and drying)? 3   Toileting, which includes using a toilet, bedpan, or urinal? 3   Putting on and taking off regular upper body clothing? 4   Taking care of personal grooming such as brushing teeth? 4   Eating meals? 4    6 Clicks Daily Activity Score 21   Functional Mobility   Sit to Stand Supervised   Bed, Chair, Wheelchair Transfer Standby Assist   Toilet Transfers Contact Guard Assist   Transfer Method Stand Step   Comments to br/sink using fww; walk in room, to chair/bed with HHA.   Visual Perception   Visual Perception  WDL   Activity Tolerance   Sitting in Chair 6   Sitting Edge of Bed 7   Standing 10   Patient / Family Goals   Patient / Family Goal #1 Home   Short Term Goals   Short Term Goal # 1 FB dressing with Spv within 5 days   Short Term Goal # 2 ADL transfers with Spv within 5 days   Short Term Goal # 3 Grooming at sink with Spv within 5 days   Education Group   Education Provided Role of Occupational Therapist;Activities of Daily Living;Home Safety;Transfers   Role of Occupational Therapist Patient Response Patient;Acceptance;Explanation;Verbal Demonstration   Home Safety Patient Response Patient;Acceptance;Explanation;Verbal Demonstration   Transfers Patient Response Patient;Acceptance;Explanation;Verbal Demonstration;Action Demonstration   ADL Patient Response Patient;Acceptance;Explanation;Verbal Demonstration;Action Demonstration   Occupational Therapy Initial Treatment Plan    Treatment Interventions Self Care / Activities of Daily Living;Neuro Re-Education / Balance;Therapeutic Activity   Treatment Frequency 3 Times per Week   Duration Until Therapy Goals Met   Problem List   Problem List Decreased Active Daily Living Skills;Decreased Homemaking Skills;Safety Awareness Deficits / Cognition;Impaired Postural Control / Balance   Anticipated Discharge Equipment and Recommendations   DC Equipment Recommendations Unable to determine at this time   Discharge Recommendations Recommend home health for continued occupational therapy services   Interdisciplinary Plan of Care Collaboration   IDT Collaboration with  Nursing;Certified Nursing Assistant   Patient Position at End of Therapy In Bed;Bed Alarm On;Call Light  within Reach;Tray Table within Reach;Phone within Reach   Collaboration Comments OT eval

## 2024-12-02 NOTE — PROGRESS NOTES
Mary Young has chosen to leave the hospital against medical advice. The attending physician has not discharged the patient. The provider is aware that the patient is leaving against medical advice. Patient expresses understanding of the risks of leaving the hospital and benefits of admission including but not limited to, the availability and proximity of nurses, physicians, monitoring, diagnostic testing, treatment, and a safe discharge plan. The patient had the opportunity to ask questions about their medical condition and recommended treatment. Patient is aware that they may return for care at any time. IV removed. Patient calling family for ride.

## 2024-12-02 NOTE — PROGRESS NOTES
Telemetry Shift Summary    Rhythm SR/ST peaky T-wave; 4 beats of VT  HR Range   Ectopy r-o-fPAC  Measurements 0.14/0.08/0.40        Normal Values  Rhythm SR  HR Range    Measurements 0.12-0.20 / 0.06-0.10  / 0.30-0.52

## 2024-12-02 NOTE — PROGRESS NOTES
This RN has provided extensive and repeated education on fall risk. Patient is unsteady almost stumbling even with FWW. Patient is refusing cardiac monitoring and states she feels angella she is in [facility]. Patient relating unsteady gait due to just barely being woken up. Patient wanting to talk to MD about discharging. Patient upset and at this point and is refusing bed alarm. Still adamant that she will not fall. Charge RN notified of the above.

## 2024-12-02 NOTE — CARE PLAN
The patient is Watcher - Medium risk of patient condition declining or worsening    Shift Goals  Clinical Goals: CIWA; Q4 Neuro; Safety  Patient Goals: Go home  Family Goals: updates    Progress made toward(s) clinical / shift goals:  CIWA under 4 for this shift. Continue with Librium per MAR. Q4 hour neuro checks. Safety and free from injuries. Call light and personal belongings within reach. Bed and strip alarm in place. Fluid restriction. Monitor labs. Seizure precautions in place.     Problem: Knowledge Deficit - Standard  Goal: Patient and family/care givers will demonstrate understanding of plan of care, disease process/condition, diagnostic tests and medications  Outcome: Progressing     Problem: Seizure Precautions  Goal: Implementation of seizure precautions  Outcome: Progressing     Problem: Fall Risk  Goal: Patient will remain free from falls  Outcome: Progressing       Patient is not progressing towards the following goals:N/A

## 2024-12-02 NOTE — DISCHARGE SUMMARY
Discharge Summary      CHIEF COMPLAINT ON ADMISSION  Chief Complaint   Patient presents with    Syncope     Past out at home    found pt on floor in kitchen this morning  unsure LOC for about 5-10 minutes per   no Hx of such    pt does not remember or why this incident happened   twist R ankle as well  hurt it today   head injury  is not on bld thinner   pt is a daily drinking   unsure if she has been drinking today  hit head when she past out        Reason for Admission  Syncopy; Ankle Pain; Head Injury     Admission Date  11/30/2024    CODE STATUS  Prior    HPI & HOSPITAL COURSE  This is a 64 y.o. female here with Alcohol Withdrawal Seizures and COVID 19- she was admitted and started on Scheduled Librium taper and Ativan per [facility] protocol, patient was counseled about alcohol use and risks and was agreeable with treatment. Spoke with daughter on phone as they had threatened to take AMA the day prior. Explained that patient was at risk for worsening w/d and needed to remain and continue treatment. Also advised that once stable we would start medications to promote sobriety- daughter in agreement. However patient signed out AMA very early the following morning.   No notes on file    Therefore, she is discharged in guarded and stable condition against medcial advice.    Patient left AMA    Discharge Date  12/2/2024    FOLLOW UP ITEMS POST DISCHARGE  AMA    DISCHARGE DIAGNOSES  Principal Problem:    Alcohol withdrawal seizure with complication (HCC) (POA: Yes)  Active Problems:    Essential hypertension (POA: Yes)    Subclinical hypothyroidism (POA: Yes)    Leukopenia (POA: Yes)    Hyponatremia (POA: Yes)    Elevated liver enzymes (POA: Yes)    Hyperglycemia (POA: Yes)    Right ankle pain (POA: Yes)    Hypokalemia (POA: Yes)    Intertriginous candidiasis (POA: Yes)    Thrombocytopenia (HCC) (POA: Yes)    Cerebral atrophy (HCC) (POA: Yes)    Alcohol liver damage (HCC) (POA: Yes)  Resolved Problems:    * No  resolved hospital problems. *      FOLLOW UP  No future appointments.  Joe Bruce M.D.  [address]  [place] [address] [phone]    Schedule an appointment as soon as possible for a visit       Etienne Pacheco M.D.  [address]  [place] [address]  [phone]    Schedule an appointment as soon as possible for a visit       [facility]  [address]  [facility] [address]  [phone]  Go to   If you become interested in alcohol detox treatment      MEDICATIONS ON DISCHARGE     Medication List        ASK your doctor about these medications        Instructions   CALCIUM PO   Take 1 Tablet by mouth every morning.  Dose: 1 Tablet     CHOLECALCIFEROL PO   Take 1 Tablet by mouth every morning.  Dose: 1 Tablet     Telmisartan-amLODIPine 80-5 MG Tabs   Take 1 Tablet by mouth every morning.  Dose: 1 Tablet     VITAMIN C PO   Take 1 Tablet by mouth every morning.  Dose: 1 Tablet              Allergies  Allergies   Allergen Reactions    Lisinopril Cough     Cough       DIET  No orders of the defined types were placed in this encounter.      ACTIVITY  AMA    CONSULTATIONS  none    PROCEDURES  none    LABORATORY  Lab Results   Component Value Date    SODIUM 132 (L) 12/02/2024    POTASSIUM 3.9 12/02/2024    CHLORIDE 98 12/02/2024    CO2 19 (L) 12/02/2024    GLUCOSE 94 12/02/2024    BUN 12 12/02/2024    CREATININE 0.70 12/02/2024        Lab Results   Component Value Date    WBC 3.2 (L) 12/01/2024    HEMOGLOBIN 13.8 12/01/2024    HEMATOCRIT 38.7 12/01/2024    PLATELETCT 59 (L) 12/01/2024        Total time of the discharge process exceeds   minutes.

## 2024-12-02 NOTE — CARE PLAN
The patient is Stable - Low risk of patient condition declining or worsening    Shift Goals  Clinical Goals: CIWA, Q4H Neuro, Monitor labs, Monitor VS  Patient Goals: Talk to the doctor and get released soon  Family Goals: updates    Progress made toward(s) clinical / shift goals:  CIWA protocol was followed with Q4 scoring and Q4 neuro checks. Patient was explained for her safety she should call to use the bathroom. Call light within reach and bed locked in lowest position with bed alarm on.    Patient is not progressing towards the following goals:      Problem: Optimal Care for Alcohol Withdrawal  Goal: Optimal Care for the alcohol withdrawal patient  12/1/2024 1911 by Stewart Patterson, R.N.  Outcome: Progressing  12/1/2024 1810 by Stewart Patterson R.N.  Outcome: Progressing     Problem: Fall Risk  Goal: Patient will remain free from falls  12/1/2024 1911 by Stewart Patterson, R.N.  Outcome: Progressing  12/1/2024 1810 by Stewart Patterson, R.N.  Outcome: Progressing